# Patient Record
Sex: FEMALE | Race: WHITE | NOT HISPANIC OR LATINO | Employment: FULL TIME | URBAN - METROPOLITAN AREA
[De-identification: names, ages, dates, MRNs, and addresses within clinical notes are randomized per-mention and may not be internally consistent; named-entity substitution may affect disease eponyms.]

---

## 2023-02-08 ENCOUNTER — INITIAL PRENATAL (OUTPATIENT)
Dept: OBGYN CLINIC | Facility: CLINIC | Age: 33
End: 2023-02-08

## 2023-02-08 VITALS
BODY MASS INDEX: 29.98 KG/M2 | DIASTOLIC BLOOD PRESSURE: 70 MMHG | SYSTOLIC BLOOD PRESSURE: 120 MMHG | WEIGHT: 175.6 LBS | HEIGHT: 64 IN

## 2023-02-08 DIAGNOSIS — Z36.89 ENCOUNTER FOR OTHER SPECIFIED ANTENATAL SCREENING: Primary | ICD-10-CM

## 2023-02-08 DIAGNOSIS — Z3A.08 8 WEEKS GESTATION OF PREGNANCY: ICD-10-CM

## 2023-02-08 DIAGNOSIS — Z12.4 SCREENING FOR MALIGNANT NEOPLASM OF THE CERVIX: ICD-10-CM

## 2023-02-08 DIAGNOSIS — F41.9 ANXIETY: ICD-10-CM

## 2023-02-08 DIAGNOSIS — O99.211 OBESITY AFFECTING PREGNANCY IN FIRST TRIMESTER: ICD-10-CM

## 2023-02-08 DIAGNOSIS — O99.341 MENTAL DISORDER AFFECTING PREGNANCY IN FIRST TRIMESTER: ICD-10-CM

## 2023-02-08 DIAGNOSIS — Z36.87 UNSURE OF LMP (LAST MENSTRUAL PERIOD) AS REASON FOR ULTRASOUND SCAN: ICD-10-CM

## 2023-02-08 DIAGNOSIS — O99.210 OBESITY IN PREGNANCY WITH ANTEPARTUM COMPLICATION: Primary | ICD-10-CM

## 2023-02-08 LAB
EXTERNAL CHLAMYDIA SCREEN: NORMAL
EXTERNAL GONORRHEA SCREEN: NORMAL
SL AMB  POCT GLUCOSE, UA: NEGATIVE
SL AMB POCT URINE PROTEIN: NEGATIVE

## 2023-02-08 NOTE — PROGRESS NOTES
OB INTAKE INTERVIEW  Patient is 33 y  o who presents for OB intake at 8 6wks  She is accompanied by: Spouse  The father of her baby Jim Corcoran) is involved in the pregnancy    Last Menstrual Period: 22  Ultrasound: Measured 8 weeks 6 days on 23  Estimated Date of Delivery: 2023 confirmed by ultrasound  Signs/Symptoms of Pregnancy  Current pregnancy symptoms: Nausea, vomiting, fatigue, breast tenderness  Constipation no  Headaches no  Cramping/spotting YES (Cramping on and off, denies spotting)   PICA cravings no    Diabetes-  Body mass index is 30 38 kg/m²  If patient has 1 or more, please order early 1 hour GTT  History of GDM no  BMI >30 YES  History of PCOS or current metformin use no  History of LGA/macrosomic infant (4000g/9lbs) no    If patient has 2 or more, please order early 1 hour GTT  AMA no  First degree relative with type 2 diabetes YES  History of chronic HTN, hyperlipidemia, elevated A1C no  High risk race (, , ,  or ) no    Hypertension- if you answer yes, please order preeclampsia labs (cbc, comprehensive metabolic panel, urine protein creatinine ratio, uric acid)  History of of chronic HTN no  History of gestational HTN no  History of preeclampsia, eclampsia, or HELLP syndrome no  History of diabetes no  History of lupus, autoimmune disease, kidney disease, antiphospholipid syndrome, rheumatoid arthritis, sjogren's syndrome no    Thyroid- if yes order TSH with reflex T4 (Unless TSH value on file within last 4 weeks then do not need to order)  History of thyroid disease no    Bleeding Disorder or Hx of DVT-patient or first degree relative with history of  Order the following if not done previously     (Factor V, antithrombin III, prothrombin gene mutation, protein C and S Ag, lupus anticoagulant, anticardiolipin, beta-2 glycoprotein)   no    OB/GYN-  History of abnormal pap smear no  History of HPV no  History of Herpes/HSV no  History of other STI (gonorrhea, chlamydia, trich) (or current partner with Hep B, Hep C, or HIV) no  History of prior  no  History of prior  no  History of  delivery prior to 36 weeks 6 days no  History of blood transfusion no  Ok for blood transfusion -Yes    Substance screening-   History of tobacco use no  Currently using tobacco no  Currently using alcohol no  Presently using drugs no  Past drug use (if yes, order urine drug screening panel)  no  IV drug use (if yes, order urine drug screening panel) no  Partner drug use (if yes, order urine drug screening panel) no  Parent/Family drug use (do not order urine drug screening panel just for family hx) no    Depression Screening-  PHQ-9 Score: (2)    MRSA Screening-   Does the pt have a hx of MRSA? no  If yes- please follow MRSA protocol and obtain a nasal swab for MRSA culture at 12 week visit  Immunizations:  Influenza vaccine given this season (ask date) -Yes, 10/01/22 through employer   Discussed Tdap vaccine (ask date) -yes   Discussed COVID Vaccine (request pt upload card or bring to next visit) -Yes, 1 and 3 dose  No booster  Genetic/MFM-  Do you or your partner have a history of any of the following in yourselves or first degree relatives? Cystic fibrosis no  Spinal muscular atrophy no  Hemoglobinopathy/Sickle Cell/Thalassemia no  Fragile X Intellectual Disability no    If yes, discuss carrier screening and recommend consultation with MFM/genetic counseling  If no, discuss option for carrier screening and/or genetic testing with Nuchal Ultrasound  Patient interested -Pt declined genetic NIPT  Pt agreed to early anatomy scan  Appointment at [address] -Patient will call to schedule    Interview education  [facility] Pregnancy Essentials Book reviewed and discussed -Yes      Prenatal lab work scripts -Yes    Extra labs ordered:  Early 1 HR GTT      The patient has a history now or in prior pregnancy notable for:  IOL due to gallstones       Details that I feel the provider should be aware of: Depression, Anxiety, panic attacks  The patient was oriented to our practice, reviewed delivering physicians and Aravind Anaya in [facility] for Delivery  All questions were answered      Interviewed by: Kaylie Douglas RN

## 2023-02-11 LAB
C TRACH RRNA SPEC QL NAA+PROBE: NOT DETECTED
CLINICAL INFO: NORMAL
CYTO CVX: NORMAL
CYTOLOGY CMNT CVX/VAG CYTO-IMP: NORMAL
DATE PREVIOUS BX: NORMAL
HPV E6+E7 MRNA CVX QL NAA+PROBE: NOT DETECTED
LMP START DATE: NORMAL
N GONORRHOEA RRNA SPEC QL NAA+PROBE: NOT DETECTED
SL AMB PREV. PAP:: NORMAL
SPECIMEN SOURCE CVX/VAG CYTO: NORMAL

## 2023-02-13 LAB
EXTERNAL ABO GROUPING: NORMAL
EXTERNAL ANTIBODY SCREEN: NORMAL
EXTERNAL HEMATOCRIT: 36.4 %
EXTERNAL HEMOGLOBIN: 12.5 G/DL
EXTERNAL HEPATITIS B SURFACE ANTIGEN: NORMAL
EXTERNAL HIV-1/2 AB-AG: NORMAL
EXTERNAL PLATELET COUNT: 266 K/ÂΜL
EXTERNAL RH FACTOR: NEGATIVE
EXTERNAL RUBELLA IGG QUANTITATION: NORMAL
EXTERNAL SYPHILIS RPR SCREEN: NORMAL

## 2023-02-14 LAB
ABO GROUP BLD: NORMAL
APPEARANCE UR: CLEAR
BACTERIA UR QL AUTO: ABNORMAL /HPF
BASOPHILS # BLD AUTO: 32 CELLS/UL (ref 0–200)
BASOPHILS NFR BLD AUTO: 0.5 %
BILIRUB UR QL STRIP: NEGATIVE
BLD GP AB SCN SERPL QL: NORMAL
COLOR UR: ABNORMAL
EOSINOPHIL # BLD AUTO: 57 CELLS/UL (ref 15–500)
EOSINOPHIL NFR BLD AUTO: 0.9 %
ERYTHROCYTE [DISTWIDTH] IN BLOOD BY AUTOMATED COUNT: 12.6 % (ref 11–15)
GLUCOSE 1H P 50 G GLC PO SERPL-MCNC: 76 MG/DL
GLUCOSE UR QL STRIP: NEGATIVE
HBV SURFACE AG SERPL QL IA: NORMAL
HCT VFR BLD AUTO: 36.4 % (ref 35–45)
HCV AB S/CO SERPL IA: <0.02
HCV AB SERPL QL IA: NORMAL
HGB BLD-MCNC: 12.5 G/DL (ref 11.7–15.5)
HGB UR QL STRIP: NEGATIVE
HIV 1+2 AB+HIV1 P24 AG SERPL QL IA: NORMAL
HYALINE CASTS #/AREA URNS LPF: ABNORMAL /LPF
KETONES UR QL STRIP: NEGATIVE
LEUKOCYTE ESTERASE UR QL STRIP: ABNORMAL
LYMPHOCYTES # BLD AUTO: 1506 CELLS/UL (ref 850–3900)
LYMPHOCYTES NFR BLD AUTO: 23.9 %
MCH RBC QN AUTO: 29.9 PG (ref 27–33)
MCHC RBC AUTO-ENTMCNC: 34.3 G/DL (ref 32–36)
MCV RBC AUTO: 87.1 FL (ref 80–100)
MONOCYTES # BLD AUTO: 447 CELLS/UL (ref 200–950)
MONOCYTES NFR BLD AUTO: 7.1 %
NEUTROPHILS # BLD AUTO: 4259 CELLS/UL (ref 1500–7800)
NEUTROPHILS NFR BLD AUTO: 67.6 %
NITRITE UR QL STRIP: NEGATIVE
PH UR STRIP: 5.5 [PH] (ref 5–8)
PLATELET # BLD AUTO: 266 THOUSAND/UL (ref 140–400)
PMV BLD REES-ECKER: 10.3 FL (ref 7.5–12.5)
PROT UR QL STRIP: ABNORMAL
RBC # BLD AUTO: 4.18 MILLION/UL (ref 3.8–5.1)
RBC #/AREA URNS HPF: ABNORMAL /HPF
RH BLD: NORMAL
RPR SER QL: NORMAL
RUBV IGG SERPL IA-ACNC: 4.52 INDEX
SP GR UR STRIP: 1.03 (ref 1–1.03)
SQUAMOUS #/AREA URNS HPF: ABNORMAL /HPF
WBC # BLD AUTO: 6.3 THOUSAND/UL (ref 3.8–10.8)
WBC #/AREA URNS HPF: ABNORMAL /HPF

## 2023-02-20 PROBLEM — Z3A.08 8 WEEKS GESTATION OF PREGNANCY: Status: ACTIVE | Noted: 2023-02-08

## 2023-02-20 PROBLEM — O99.210 OBESITY IN PREGNANCY WITH ANTEPARTUM COMPLICATION: Status: ACTIVE | Noted: 2023-02-08

## 2023-02-20 NOTE — PROGRESS NOTES
Routine Prenatal Visit  [address] [facility], [address], [facility], [address] [facility]    Assessment/Plan:  Barb Orona is a 33y o  year old  at 10w7d who presents for routine prenatal visit  1  Obesity in pregnancy with antepartum complication  Assessment & Plan:  Early glucose screening ordered      2  Mental disorder affecting pregnancy in first trimester    3  Anxiety    4  BMI 30 0-30 9,adult    5  8 weeks gestation of pregnancy    6  Unsure of LMP (last menstrual period) as reason for ultrasound scan  -     AMB US OB < 14 weeks single or first gestation level 1    7  Screening for malignant neoplasm of the cervix  -     Thinprep Tis Pap and HPV mRNA E6/E7, Chlamydia/N gonorrhoeae        Subjective:     CC: Prenatal care    Gi Aragon is a 33 y o   female who presents for routine prenatal care at 10w7d  Pregnancy ROS: no leakage of fluid, pelvic pain, or vaginal bleeding  no fetal movement  The following portions of the patient's history were reviewed and updated as appropriate: allergies, current medications, past family history, past medical history, obstetric history, gynecologic history, past social history, past surgical history and problem list       Objective:  LMP 2022 (Exact Date)   Pregravid Weight/BMI: 79 4 kg (175 lb) (BMI 30 28)  Current Weight:     Total Weight Gain: 0 272 kg (9 6 oz)        General: Well appearing, no distress  Respiratory: Unlabored breathing  Cardiovascular: Regular rate  Abdomen: Soft, gravid, nontender  Fundal Height: Appropriate for gestational age  Extremities: Warm and well perfused  Non tender

## 2023-03-07 ENCOUNTER — ROUTINE PRENATAL (OUTPATIENT)
Dept: OBGYN CLINIC | Facility: CLINIC | Age: 33
End: 2023-03-07

## 2023-03-07 VITALS — WEIGHT: 177 LBS | SYSTOLIC BLOOD PRESSURE: 102 MMHG | DIASTOLIC BLOOD PRESSURE: 60 MMHG | BODY MASS INDEX: 30.62 KG/M2

## 2023-03-07 DIAGNOSIS — O99.341 MENTAL DISORDER AFFECTING PREGNANCY IN FIRST TRIMESTER: ICD-10-CM

## 2023-03-07 DIAGNOSIS — O99.210 OBESITY IN PREGNANCY WITH ANTEPARTUM COMPLICATION: Primary | ICD-10-CM

## 2023-03-07 DIAGNOSIS — Z36.89 ENCOUNTER FOR OTHER SPECIFIED ANTENATAL SCREENING: ICD-10-CM

## 2023-03-07 DIAGNOSIS — Z3A.12 12 WEEKS GESTATION OF PREGNANCY: ICD-10-CM

## 2023-03-07 LAB
SL AMB  POCT GLUCOSE, UA: NEGATIVE
SL AMB POCT URINE PROTEIN: NEGATIVE

## 2023-03-07 RX ORDER — CETIRIZINE HYDROCHLORIDE 10 MG/1
10 TABLET ORAL AS NEEDED
COMMUNITY

## 2023-03-07 NOTE — ASSESSMENT & PLAN NOTE
Off medication since first pregnancy  Doing well, no complaints  Support offered if symptoms change

## 2023-03-07 NOTE — ASSESSMENT & PLAN NOTE
Doing well, nausea improving  Normal 1st pn and 1hr GTT reviewed  Scheduled for MFM 1st trimester evaluation tomorrow  MSAFP orders given for 15-18 weeks

## 2023-03-07 NOTE — PROGRESS NOTES
Routine Prenatal Visit  [address]   [address], [address], [facility], [facility] [address]    Assessment/Plan:  Mercedes Alpers is a 33y o  year old  at 12w5d who presents for routine prenatal visit  1  Obesity in pregnancy with antepartum complication  Assessment & Plan:  Doing well, nausea improving  Normal 1st pn and 1hr GTT reviewed  Scheduled for MFM 1st trimester evaluation tomorrow  MSAFP orders given for 15-18 weeks  2  12 weeks gestation of pregnancy  -     POCT urine dip    3  Mental disorder affecting pregnancy in first trimester  Assessment & Plan:  Off medication since first pregnancy  Doing well, no complaints  Support offered if symptoms change  4  Encounter for other specified  screening  -     Alpha fetoprotein, maternal; Future  -     Alpha fetoprotein, maternal      Next OB Visit 4 weeks  Subjective:     CC: Prenatal care    Sadiq Burns is a 33 y o   female who presents for routine prenatal care at 12w5d  Pregnancy ROS: no leakage of fluid, pelvic pain, or vaginal bleeding  no fetal movement      The following portions of the patient's history were reviewed and updated as appropriate: allergies, current medications, past family history, past medical history, obstetric history, gynecologic history, past social history, past surgical history and problem list       Objective:  /60   Wt 80 3 kg (177 lb)   LMP 2022 (Exact Date)   BMI 30 62 kg/m²   Pregravid Weight/BMI: 79 4 kg (175 lb) (BMI 30 28)  Current Weight: 80 3 kg (177 lb)   Total Weight Gain: 0 907 kg (2 lb)   Pre-Christopher Vitals    Flowsheet Row Most Recent Value   Prenatal Assessment    Prenatal Vitals    Blood Pressure 102/60   Weight - Scale 80 3 kg (177 lb)   Urine Albumin/Glucose    Dilation/Effacement/Station    Vaginal Drainage    Edema            General: Well appearing, no distress  Abdomen: Soft, gravid, nontender  Fundal Height: Appropriate for gestational age  +FHR

## 2023-03-08 ENCOUNTER — ROUTINE PRENATAL (OUTPATIENT)
Dept: PERINATAL CARE | Facility: OTHER | Age: 33
End: 2023-03-08

## 2023-03-08 VITALS
HEART RATE: 89 BPM | BODY MASS INDEX: 30.32 KG/M2 | DIASTOLIC BLOOD PRESSURE: 52 MMHG | HEIGHT: 64 IN | WEIGHT: 177.6 LBS | SYSTOLIC BLOOD PRESSURE: 98 MMHG

## 2023-03-08 DIAGNOSIS — O36.80X0 ENCOUNTER TO DETERMINE FETAL VIABILITY OF PREGNANCY, SINGLE OR UNSPECIFIED FETUS: Primary | ICD-10-CM

## 2023-03-08 DIAGNOSIS — Z36.82 ENCOUNTER FOR NUCHAL TRANSLUCENCY TESTING: ICD-10-CM

## 2023-03-08 DIAGNOSIS — O99.211 OBESITY AFFECTING PREGNANCY IN FIRST TRIMESTER: ICD-10-CM

## 2023-03-08 DIAGNOSIS — Z3A.12 12 WEEKS GESTATION OF PREGNANCY: ICD-10-CM

## 2023-03-08 DIAGNOSIS — Z36.89 ENCOUNTER FOR OTHER SPECIFIED ANTENATAL SCREENING: ICD-10-CM

## 2023-03-08 RX ORDER — DIPHENHYDRAMINE HYDROCHLORIDE 25 MG/1
25 CAPSULE ORAL DAILY
COMMUNITY
Start: 2023-02-21

## 2023-03-08 NOTE — PROGRESS NOTES
The patient was seen today for an ultrasound  Please see ultrasound report (located under Ob Procedures) for additional details  Thank you very much for allowing us to participate in the care of this very nice patient  Should you have any questions, please do not hesitate to contact me  Mendez Gonzalez MD [id number]  Attending Physician, [place]

## 2023-04-06 ENCOUNTER — ROUTINE PRENATAL (OUTPATIENT)
Dept: OBGYN CLINIC | Facility: CLINIC | Age: 33
End: 2023-04-06

## 2023-04-06 VITALS
HEIGHT: 64 IN | BODY MASS INDEX: 30.73 KG/M2 | DIASTOLIC BLOOD PRESSURE: 62 MMHG | WEIGHT: 180 LBS | SYSTOLIC BLOOD PRESSURE: 90 MMHG

## 2023-04-06 DIAGNOSIS — O26.892 RH NEGATIVE STATE IN ANTEPARTUM PERIOD, SECOND TRIMESTER: ICD-10-CM

## 2023-04-06 DIAGNOSIS — O99.342 MENTAL DISORDER AFFECTING PREGNANCY IN SECOND TRIMESTER: ICD-10-CM

## 2023-04-06 DIAGNOSIS — O99.210 OBESITY IN PREGNANCY WITH ANTEPARTUM COMPLICATION: ICD-10-CM

## 2023-04-06 DIAGNOSIS — J01.00 SUBACUTE MAXILLARY SINUSITIS: ICD-10-CM

## 2023-04-06 DIAGNOSIS — Z3A.17 17 WEEKS GESTATION OF PREGNANCY: Primary | ICD-10-CM

## 2023-04-06 DIAGNOSIS — Z67.91 RH NEGATIVE STATE IN ANTEPARTUM PERIOD, SECOND TRIMESTER: ICD-10-CM

## 2023-04-06 DIAGNOSIS — F41.9 ANXIETY: ICD-10-CM

## 2023-04-06 LAB
SL AMB  POCT GLUCOSE, UA: NEGATIVE
SL AMB POCT URINE PROTEIN: NEGATIVE

## 2023-04-06 RX ORDER — AMOXICILLIN 500 MG/1
500 CAPSULE ORAL EVERY 12 HOURS SCHEDULED
Qty: 20 CAPSULE | Refills: 0 | Status: SHIPPED | OUTPATIENT
Start: 2023-04-06 | End: 2023-04-16

## 2023-04-06 NOTE — PROGRESS NOTES
"Routine Prenatal Visit  [address], [address], [facility], [facility]    Assessment/Plan:  Jerad Laird is a 33y o  year old  at 17w0d who presents for routine prenatal visit  1  Mental disorder affecting pregnancy in first trimester    2  Obesity in pregnancy with antepartum complication    3  12 weeks gestation of pregnancy    4  Anxiety    5  BMI 30 0-30 9,adult      + co of  Sinus pressure  Not relieved w  Allergy meds  + hx of  Sinus infections  No real  Dc from  Nose some cough  Reviewed  Hydration  Declines genetic screening  Reviewed with pt now is the time for  MSAFP  Declines  No bleeding  Labs and   US reviewed   Subjective:     CC: Prenatal care    Matt Bronson is a 33 y o   female who presents for routine prenatal care at 17w0d  Pregnancy ROS: no  leakage of fluid, pelvic pain, or vaginal bleeding   + fetal movement  The following portions of the patient's history were reviewed and updated as appropriate: allergies, current medications, past family history, past medical history, obstetric history, gynecologic history, past social history, past surgical history and problem list       Objective:  BP 90/62   Ht 5' 3 5\" (1 613 m)   Wt 81 6 kg (180 lb)   LMP 2022 (Exact Date)   BMI 31 39 kg/m²   Pregravid Weight/BMI: 79 4 kg (175 lb) (BMI 30 51)  Current Weight: 81 6 kg (180 lb)   Total Weight Gain: 2 268 kg (5 lb)   Pre-Christopher Vitals    Flowsheet Row Most Recent Value   Prenatal Assessment    Movement Absent   Prenatal Vitals    Blood Pressure 90/62   Weight - Scale 81 6 kg (180 lb)   Urine Albumin/Glucose    Dilation/Effacement/Station    Vaginal Drainage    Edema    LLE Edema None   RLE Edema None   Facial Edema Trace           General: Well appearing, no distress  Respiratory: Unlabored breathing  Cardiovascular: Regular rate  Abdomen: Soft, gravid, nontender  Fundal Height: Appropriate for gestational age    Extremities: Warm " and well perfused  Non tender

## 2023-05-01 ENCOUNTER — ROUTINE PRENATAL (OUTPATIENT)
Dept: OBGYN CLINIC | Facility: CLINIC | Age: 33
End: 2023-05-01

## 2023-05-01 VITALS
HEIGHT: 64 IN | BODY MASS INDEX: 31.76 KG/M2 | SYSTOLIC BLOOD PRESSURE: 104 MMHG | WEIGHT: 186 LBS | DIASTOLIC BLOOD PRESSURE: 58 MMHG

## 2023-05-01 DIAGNOSIS — O26.892 RH NEGATIVE STATE IN ANTEPARTUM PERIOD, SECOND TRIMESTER: ICD-10-CM

## 2023-05-01 DIAGNOSIS — Z3A.20 20 WEEKS GESTATION OF PREGNANCY: Primary | ICD-10-CM

## 2023-05-01 DIAGNOSIS — F41.9 ANXIETY: ICD-10-CM

## 2023-05-01 DIAGNOSIS — Z67.91 RH NEGATIVE STATE IN ANTEPARTUM PERIOD, SECOND TRIMESTER: ICD-10-CM

## 2023-05-01 LAB
SL AMB  POCT GLUCOSE, UA: NORMAL
SL AMB POCT URINE PROTEIN: NORMAL

## 2023-05-01 NOTE — PROGRESS NOTES
"Routine Prenatal Visit  [address] [facility], [address], [facility], [place], [address] [facility] [address]    Assessment/Plan:  Jitendra Marquis is a 33y o  year old  at 22w2d who presents for routine prenatal visit  1  20 weeks gestation of pregnancy  -     POCT urine dip    2  Rh negative state in antepartum period, second trimester    3  Anxiety       Labs reviewed   Normal  Msafp  Anatomy scan in 2 d  No bleeding  Subjective:     CC: Prenatal care    Lisa Gonzalez is a 33 y o   female who presents for routine prenatal care at 22w2d  Pregnancy ROS: no  leakage of fluid, pelvic pain, or vaginal bleeding   + fetal movement  The following portions of the patient's history were reviewed and updated as appropriate: allergies, current medications, past family history, past medical history, obstetric history, gynecologic history, past social history, past surgical history and problem list       Objective:  /58 (BP Location: Left arm, Patient Position: Sitting, Cuff Size: Standard)   Ht 5' 3 5\" (1 613 m)   Wt 84 4 kg (186 lb)   LMP 2022 (Exact Date)   BMI 32 43 kg/m²   Pregravid Weight/BMI: 79 4 kg (175 lb) (BMI 30 51)  Current Weight: 84 4 kg (186 lb)   Total Weight Gain: 4 99 kg (11 lb)   Pre- Vitals    Flowsheet Row Most Recent Value   Prenatal Assessment    Fetal Heart Rate 146-158   Fundal Height (cm) 21 cm   Movement Present   Prenatal Vitals    Blood Pressure 104/58   Weight - Scale 84 4 kg (186 lb)   Urine Albumin/Glucose    Dilation/Effacement/Station    Vaginal Drainage    Draining Fluid No   Edema    LLE Edema None   RLE Edema None   Facial Edema None           General: Well appearing, no distress  Respiratory: Unlabored breathing  Cardiovascular: Regular rate  Abdomen: Soft, gravid, nontender  Fundal Height: Appropriate for gestational age  Extremities: Warm and well perfused  Non tender    "

## 2023-05-03 ENCOUNTER — ROUTINE PRENATAL (OUTPATIENT)
Dept: PERINATAL CARE | Facility: OTHER | Age: 33
End: 2023-05-03

## 2023-05-03 VITALS
HEIGHT: 63 IN | BODY MASS INDEX: 33.17 KG/M2 | HEART RATE: 90 BPM | DIASTOLIC BLOOD PRESSURE: 54 MMHG | WEIGHT: 187.2 LBS | SYSTOLIC BLOOD PRESSURE: 100 MMHG

## 2023-05-03 DIAGNOSIS — Z36.86 ENCOUNTER FOR ANTENATAL SCREENING FOR CERVICAL LENGTH: ICD-10-CM

## 2023-05-03 DIAGNOSIS — O99.210 OBESITY IN PREGNANCY WITH ANTEPARTUM COMPLICATION: Primary | ICD-10-CM

## 2023-05-03 DIAGNOSIS — Z3A.20 20 WEEKS GESTATION OF PREGNANCY: ICD-10-CM

## 2023-05-03 NOTE — PROGRESS NOTES
Ultrasound Probe Disinfection    A transvaginal ultrasound was performed  Prior to use, disinfection was performed with High Level Disinfection Process (Trophon)  Probe serial number [id number]: [id number] was used        Radha Campos  05/03/23  1:31 PM

## 2023-05-03 NOTE — PROGRESS NOTES
The patient was seen today for an ultrasound  Please see ultrasound report (located under Ob Procedures) for additional details  Thank you very much for allowing us to participate in the care of this very nice patient  Should you have any questions, please do not hesitate to contact me  MD Sukhjinder Chowdhuryucah  Attending Physician, [place]

## 2023-05-31 ENCOUNTER — ROUTINE PRENATAL (OUTPATIENT)
Dept: OBGYN CLINIC | Facility: CLINIC | Age: 33
End: 2023-05-31

## 2023-05-31 VITALS
HEIGHT: 63 IN | SYSTOLIC BLOOD PRESSURE: 112 MMHG | BODY MASS INDEX: 34.38 KG/M2 | DIASTOLIC BLOOD PRESSURE: 58 MMHG | WEIGHT: 194 LBS

## 2023-05-31 DIAGNOSIS — Z36.89 ENCOUNTER FOR OTHER SPECIFIED ANTENATAL SCREENING: ICD-10-CM

## 2023-05-31 DIAGNOSIS — Z3A.24 24 WEEKS GESTATION OF PREGNANCY: ICD-10-CM

## 2023-05-31 DIAGNOSIS — O26.892 RH NEGATIVE STATE IN ANTEPARTUM PERIOD, SECOND TRIMESTER: ICD-10-CM

## 2023-05-31 DIAGNOSIS — O99.341 MENTAL DISORDER AFFECTING PREGNANCY IN FIRST TRIMESTER: ICD-10-CM

## 2023-05-31 DIAGNOSIS — Z67.91 RH NEGATIVE STATE IN ANTEPARTUM PERIOD, SECOND TRIMESTER: ICD-10-CM

## 2023-05-31 DIAGNOSIS — O99.210 OBESITY IN PREGNANCY WITH ANTEPARTUM COMPLICATION: Primary | ICD-10-CM

## 2023-05-31 DIAGNOSIS — F41.9 ANXIETY: ICD-10-CM

## 2023-05-31 LAB
SL AMB  POCT GLUCOSE, UA: NORMAL
SL AMB POCT URINE PROTEIN: NORMAL

## 2023-05-31 NOTE — PROGRESS NOTES
"Routine Prenatal Visit  [address] [facility], [address], [facility], [address] [facility] [address]    Assessment/Plan:  Kalpesh Harvey is a 33y o  year old  at 18w6d who presents for routine prenatal visit  1  Obesity in pregnancy with antepartum complication    2  Mental disorder affecting pregnancy in first trimester    3  Rh negative state in antepartum period, second trimester  Assessment & Plan:  - T&S ordered with 28 week labs  - Plan for administration of Rhogam at 28 weeks  Reviewed that labs need to be completed prior  4  24 weeks gestation of pregnancy  Assessment & Plan:  - PTL/PPROM/Bleeding precautions given  - MFM consult report from level II ultrasound reviewed  Repeat US is scheduled in 3rd trimester, per MFM recommendations  - 28 week labs ordered, lab requisition provided to patient  Patient is Rh negative  Reviewed recommendation for administration of Rhogam at next visit  - Problem list updated, results console reviewed and updated with pertinent prenatal labs  - PMH, PSH, medications reviewed and updated as needed  - Return to office in 4 wk(s) for routine prenatal care      Orders:  -     POCT urine dip    5  Encounter for other specified  screening  -     Glucose, 1H PG; Future  -     ABO/Rh; Future  -     Antibody screen; Future  -     CBC; Future  -     RPR (MONITOR) W/REFL TITER (REFL); Future  -     Glucose, 1H PG  -     ABO/Rh  -     Antibody screen  -     CBC  -     RPR (MONITOR) W/REFL TITER (REFL)    6  Anxiety    7  BMI 30 0-30 9,adult          Subjective:   Shaw Early is a 33 y o   who presents for routine prenatal care at 24w6d    Complaints today: No  LOF: No; VB: No; Contractions: No; FM: Present    Objective:  /58 (BP Location: Right arm, Patient Position: Sitting, Cuff Size: Standard)   Ht 5' 3\" (1 6 m)   Wt 88 kg (194 lb)   LMP 2022 (Exact Date)   BMI 34 37 kg/m²     General: Well appearing, no distress  Respiratory: " Unlabored breathing  Cardiovascular: Regular rate  Abdomen: Soft, gravid, nontender  Extremities: Warm and well perfused  Non tender      Pregravid Weight/BMI: 79 4 kg (175 lb) (BMI 31 01)  Current Weight: 88 kg (194 lb)   Total Weight Gain: 8 618 kg (19 lb)     Pre-Christopher Vitals    Flowsheet Row Most Recent Value   Prenatal Assessment    Fetal Heart Rate 145   Fundal Height (cm) 24 cm   Movement Present   Prenatal Vitals    Blood Pressure 112/58   Weight - Scale 88 kg (194 lb)   Urine Albumin/Glucose    Dilation/Effacement/Station    Vaginal Drainage    Draining Fluid No   Edema    LLE Edema None   RLE Edema None   Facial Edema None           Cierra Lambert MD  2023 5:32 PM

## 2023-05-31 NOTE — ASSESSMENT & PLAN NOTE
- T&S ordered with 28 week labs  - Plan for administration of Rhogam at 28 weeks  Reviewed that labs need to be completed prior

## 2023-05-31 NOTE — ASSESSMENT & PLAN NOTE
- PTL/PPROM/Bleeding precautions given  - MFM consult report from level II ultrasound reviewed  Repeat US is scheduled in 3rd trimester, per [facility] recommendations  - 28 week labs ordered, lab requisition provided to patient  Patient is Rh negative  Reviewed recommendation for administration of Rhogam at next visit  - Problem list updated, results console reviewed and updated with pertinent prenatal labs  - PMH, PSH, medications reviewed and updated as needed    - Return to office in 4 wk(s) for routine prenatal care

## 2023-06-26 LAB
EXTERNAL ANTIBODY SCREEN: NORMAL
EXTERNAL HEMOGLOBIN: 11.1 G/DL
EXTERNAL PLATELET COUNT: 227 K/ÂΜL
EXTERNAL RH FACTOR: NEGATIVE
EXTERNAL SYPHILIS TOTAL IGG/IGM SCREENING: NORMAL

## 2023-06-27 PROBLEM — Z3A.28 28 WEEKS GESTATION OF PREGNANCY: Status: ACTIVE | Noted: 2023-02-08

## 2023-06-27 PROBLEM — O26.893 RH NEGATIVE STATE IN ANTEPARTUM PERIOD, THIRD TRIMESTER: Status: ACTIVE | Noted: 2023-04-06

## 2023-06-27 PROBLEM — O99.343 MENTAL DISORDER AFFECTING PREGNANCY IN THIRD TRIMESTER: Status: ACTIVE | Noted: 2023-02-08

## 2023-06-27 PROBLEM — O99.013 ANEMIA AFFECTING PREGNANCY IN THIRD TRIMESTER: Status: ACTIVE | Noted: 2023-06-27

## 2023-06-27 LAB
ABO GROUP BLD: NORMAL
BLD GP AB SCN SERPL QL: NORMAL
ERYTHROCYTE [DISTWIDTH] IN BLOOD BY AUTOMATED COUNT: 11.7 % (ref 11–15)
GLUCOSE 1H P 50 G GLC PO SERPL-MCNC: 107 MG/DL
HCT VFR BLD AUTO: 32.4 % (ref 35–45)
HGB BLD-MCNC: 11.1 G/DL (ref 11.7–15.5)
MCH RBC QN AUTO: 29.8 PG (ref 27–33)
MCHC RBC AUTO-ENTMCNC: 34.3 G/DL (ref 32–36)
MCV RBC AUTO: 86.9 FL (ref 80–100)
PLATELET # BLD AUTO: 227 THOUSAND/UL (ref 140–400)
PMV BLD REES-ECKER: 10.9 FL (ref 7.5–12.5)
RBC # BLD AUTO: 3.73 MILLION/UL (ref 3.8–5.1)
RH BLD: NORMAL
RPR SER QL: NORMAL
WBC # BLD AUTO: 9.5 THOUSAND/UL (ref 3.8–10.8)

## 2023-06-28 ENCOUNTER — ROUTINE PRENATAL (OUTPATIENT)
Dept: OBGYN CLINIC | Facility: CLINIC | Age: 33
End: 2023-06-28
Payer: COMMERCIAL

## 2023-06-28 VITALS
DIASTOLIC BLOOD PRESSURE: 58 MMHG | BODY MASS INDEX: 35.9 KG/M2 | HEIGHT: 63 IN | SYSTOLIC BLOOD PRESSURE: 90 MMHG | WEIGHT: 202.6 LBS

## 2023-06-28 DIAGNOSIS — O99.213 OBESITY AFFECTING PREGNANCY IN THIRD TRIMESTER: ICD-10-CM

## 2023-06-28 DIAGNOSIS — Z67.91 RH NEGATIVE STATE IN ANTEPARTUM PERIOD, THIRD TRIMESTER: ICD-10-CM

## 2023-06-28 DIAGNOSIS — O99.343 MENTAL DISORDER AFFECTING PREGNANCY IN THIRD TRIMESTER: ICD-10-CM

## 2023-06-28 DIAGNOSIS — O26.893 RH NEGATIVE STATE IN ANTEPARTUM PERIOD, THIRD TRIMESTER: ICD-10-CM

## 2023-06-28 DIAGNOSIS — Z3A.28 28 WEEKS GESTATION OF PREGNANCY: Primary | ICD-10-CM

## 2023-06-28 DIAGNOSIS — Z23 NEED FOR TDAP VACCINATION: ICD-10-CM

## 2023-06-28 LAB
SL AMB  POCT GLUCOSE, UA: NORMAL
SL AMB POCT URINE PROTEIN: NORMAL

## 2023-06-28 PROCEDURE — 90715 TDAP VACCINE 7 YRS/> IM: CPT | Performed by: OBSTETRICS & GYNECOLOGY

## 2023-06-28 PROCEDURE — 90471 IMMUNIZATION ADMIN: CPT | Performed by: OBSTETRICS & GYNECOLOGY

## 2023-06-28 PROCEDURE — PNV: Performed by: OBSTETRICS & GYNECOLOGY

## 2023-06-28 PROCEDURE — 96372 THER/PROPH/DIAG INJ SC/IM: CPT | Performed by: OBSTETRICS & GYNECOLOGY

## 2023-06-28 PROCEDURE — 81002 URINALYSIS NONAUTO W/O SCOPE: CPT | Performed by: OBSTETRICS & GYNECOLOGY

## 2023-06-28 RX ORDER — TIZANIDINE 4 MG/1
TABLET ORAL AS NEEDED
COMMUNITY
End: 2023-06-28

## 2023-06-28 NOTE — PROGRESS NOTES
"Routine Prenatal Visit  [address] [facility], [address], [facility], [address] [facility] [address]    Assessment/Plan:  Fernie De La Cruz is a 33y o  year old  at 30w11d who presents for routine prenatal visit  1  Mental disorder affecting pregnancy in third trimester  Assessment & Plan:  Doing well w/o medications  2  Obesity affecting pregnancy in third trimester  Assessment & Plan:  Growth u/s at 32 weeks      3  Rh negative state in antepartum period, third trimester  Assessment & Plan:  Received Rhogam today  4  Need for Tdap vaccination  -     TDAP VACCINE GREATER THAN OR EQUAL TO 8YO IM    5  28 weeks gestation of pregnancy        Next OB Visit 2 weeks  Subjective:     CC: Prenatal care    Collins Delgadillo is a 33 y o   female who presents for routine prenatal care at 28w6d  Pregnancy ROS: no leakage of fluid, pelvic pain, or vaginal bleeding  normal fetal movement  The following portions of the patient's history were reviewed and updated as appropriate: allergies, current medications, past family history, past medical history, obstetric history, gynecologic history, past social history, past surgical history and problem list       Objective:  BP 90/58   Ht 5' 3\" (1 6 m)   Wt 91 9 kg (202 lb 9 6 oz)   LMP 2022 (Exact Date)   BMI 35 89 kg/m²   Pregravid Weight/BMI: 79 4 kg (175 lb) (BMI 31 01)  Current Weight: 91 9 kg (202 lb 9 6 oz)   Total Weight Gain: 12 5 kg (27 lb 9 6 oz)   Pre- Vitals    Flowsheet Row Most Recent Value   Prenatal Assessment    Fetal Heart Rate 150   Fundal Height (cm) 30 cm   Movement Present   Prenatal Vitals    Blood Pressure 90/58   Weight - Scale 91 9 kg (202 lb 9 6 oz)   Urine Albumin/Glucose    Dilation/Effacement/Station    Vaginal Drainage    Draining Fluid No   Edema    LLE Edema None   RLE Edema None   Facial Edema None           General: Well appearing, no distress  Abdomen: Soft, gravid, nontender  Extremities: Non tender    "

## 2023-06-29 ENCOUNTER — TELEPHONE (OUTPATIENT)
Dept: OBGYN CLINIC | Facility: CLINIC | Age: 33
End: 2023-06-29

## 2023-06-29 LAB
DME PARACHUTE DELIVERY DATE REQUESTED: NORMAL
DME PARACHUTE ITEM DESCRIPTION: NORMAL
DME PARACHUTE ORDER STATUS: NORMAL
DME PARACHUTE SUPPLIER NAME: NORMAL
DME PARACHUTE SUPPLIER PHONE: NORMAL

## 2023-06-29 NOTE — TELEPHONE ENCOUNTER
Breast pump order request form received  Breast pump orders placed through [facility]    Order selection Spectra S2 Plus

## 2023-07-08 LAB
DME PARACHUTE DELIVERY DATE ACTUAL: NORMAL
DME PARACHUTE DELIVERY DATE REQUESTED: NORMAL
DME PARACHUTE ITEM DESCRIPTION: NORMAL
DME PARACHUTE ORDER STATUS: NORMAL
DME PARACHUTE SUPPLIER NAME: NORMAL
DME PARACHUTE SUPPLIER PHONE: NORMAL

## 2023-07-13 ENCOUNTER — ROUTINE PRENATAL (OUTPATIENT)
Dept: OBGYN CLINIC | Facility: CLINIC | Age: 33
End: 2023-07-13
Payer: COMMERCIAL

## 2023-07-13 VITALS
DIASTOLIC BLOOD PRESSURE: 62 MMHG | HEIGHT: 63 IN | SYSTOLIC BLOOD PRESSURE: 112 MMHG | WEIGHT: 203 LBS | BODY MASS INDEX: 35.97 KG/M2

## 2023-07-13 DIAGNOSIS — O99.013 ANEMIA AFFECTING PREGNANCY IN THIRD TRIMESTER: ICD-10-CM

## 2023-07-13 DIAGNOSIS — O26.893 RH NEGATIVE STATE IN ANTEPARTUM PERIOD, THIRD TRIMESTER: ICD-10-CM

## 2023-07-13 DIAGNOSIS — Z3A.31 31 WEEKS GESTATION OF PREGNANCY: Primary | ICD-10-CM

## 2023-07-13 DIAGNOSIS — Z67.91 RH NEGATIVE STATE IN ANTEPARTUM PERIOD, THIRD TRIMESTER: ICD-10-CM

## 2023-07-13 DIAGNOSIS — F41.9 ANXIETY: ICD-10-CM

## 2023-07-13 DIAGNOSIS — O99.343 MENTAL DISORDER AFFECTING PREGNANCY IN THIRD TRIMESTER: ICD-10-CM

## 2023-07-13 DIAGNOSIS — O99.213 OBESITY AFFECTING PREGNANCY IN THIRD TRIMESTER: ICD-10-CM

## 2023-07-13 LAB
SL AMB  POCT GLUCOSE, UA: NORMAL
SL AMB POCT URINE PROTEIN: NORMAL

## 2023-07-13 PROCEDURE — 81002 URINALYSIS NONAUTO W/O SCOPE: CPT | Performed by: OBSTETRICS & GYNECOLOGY

## 2023-07-13 PROCEDURE — PNV: Performed by: OBSTETRICS & GYNECOLOGY

## 2023-07-13 RX ORDER — PNV NO.95/FERROUS FUM/FOLIC AC 28MG-0.8MG
TABLET ORAL
COMMUNITY
Start: 2023-07-01

## 2023-07-13 NOTE — PROGRESS NOTES
Routine Prenatal Visit  [address] [place] [address], [address], [facility], [address] [facility],[address]    Assessment/Plan:  Gelacio Mobley is a 33y.o. year old  at 31w0d who presents for routine prenatal visit. 1. 31 weeks gestation of pregnancy  -     POCT urine dip    2. Rh negative state in antepartum period, third trimester    3. Mental disorder affecting pregnancy in third trimester    4. Obesity affecting pregnancy in third trimester    5. BMI 30.0-30.9,adult    6. Anxiety    7. Anemia affecting pregnancy in third trimester        Next OB Visit 2 weeks. Subjective:     CC: Prenatal care    Jeny Sims is a 33 y.o.  female who presents for routine prenatal care at 31w0d. Pregnancy ROS: no leakage of fluid, pelvic pain, or vaginal bleeding. normal fetal movement. The following portions of the patient's history were reviewed and updated as appropriate: allergies, current medications, past family history, past medical history, obstetric history, gynecologic history, past social history, past surgical history and problem list.      Objective:  /62 (BP Location: Right arm, Patient Position: Sitting, Cuff Size: Standard)   Ht 5' 3" (1.6 m)   Wt 92.1 kg (203 lb)   LMP 2022 (Exact Date)   BMI 35.96 kg/m²   Pregravid Weight/BMI: 79.4 kg (175 lb) (BMI 31.01)  Current Weight: 92.1 kg (203 lb)   Total Weight Gain: 12.7 kg (28 lb)   Pre-Christopher Vitals    Flowsheet Row Most Recent Value   Prenatal Assessment    Fetal Heart Rate 150   Fundal Height (cm) 32 cm   Movement Present   Prenatal Vitals    Blood Pressure 112/62   Weight - Scale 92.1 kg (203 lb)   Urine Albumin/Glucose    Dilation/Effacement/Station    Vaginal Drainage    Draining Fluid No   Edema            General: Well appearing, no distress  Abdomen: Soft, gravid, nontender  Extremities: Non tender.

## 2023-07-24 ENCOUNTER — ROUTINE PRENATAL (OUTPATIENT)
Dept: OBGYN CLINIC | Facility: CLINIC | Age: 33
End: 2023-07-24
Payer: COMMERCIAL

## 2023-07-24 VITALS
DIASTOLIC BLOOD PRESSURE: 70 MMHG | SYSTOLIC BLOOD PRESSURE: 110 MMHG | WEIGHT: 208 LBS | HEIGHT: 63 IN | BODY MASS INDEX: 36.86 KG/M2

## 2023-07-24 DIAGNOSIS — Z67.91 RH NEGATIVE STATE IN ANTEPARTUM PERIOD, THIRD TRIMESTER: ICD-10-CM

## 2023-07-24 DIAGNOSIS — O99.013 ANEMIA AFFECTING PREGNANCY IN THIRD TRIMESTER: ICD-10-CM

## 2023-07-24 DIAGNOSIS — O99.213 OBESITY AFFECTING PREGNANCY IN THIRD TRIMESTER: ICD-10-CM

## 2023-07-24 DIAGNOSIS — O99.343 MENTAL DISORDER AFFECTING PREGNANCY IN THIRD TRIMESTER: ICD-10-CM

## 2023-07-24 DIAGNOSIS — Z3A.32 32 WEEKS GESTATION OF PREGNANCY: Primary | ICD-10-CM

## 2023-07-24 DIAGNOSIS — O26.893 RH NEGATIVE STATE IN ANTEPARTUM PERIOD, THIRD TRIMESTER: ICD-10-CM

## 2023-07-24 DIAGNOSIS — F41.9 ANXIETY: ICD-10-CM

## 2023-07-24 LAB
SL AMB  POCT GLUCOSE, UA: NORMAL
SL AMB POCT URINE PROTEIN: NORMAL

## 2023-07-24 PROCEDURE — 81002 URINALYSIS NONAUTO W/O SCOPE: CPT | Performed by: OBSTETRICS & GYNECOLOGY

## 2023-07-24 PROCEDURE — PNV: Performed by: OBSTETRICS & GYNECOLOGY

## 2023-07-24 NOTE — PROGRESS NOTES
Routine Prenatal Visit  [address] [place] [address], [address], [facility], [address] [facility],[address]    Assessment/Plan:  Akosua Levy is a 33y.o. year old  at 32w4d who presents for routine prenatal visit. 1. 32 weeks gestation of pregnancy  -     POCT urine dip    2. Rh negative state in antepartum period, third trimester    3. Mental disorder affecting pregnancy in third trimester    4. Obesity affecting pregnancy in third trimester    5. BMI 30.0-30.9,adult    6. Anxiety    7. Anemia affecting pregnancy in third trimester          Subjective:     CC: Prenatal care    Geovani Pennington is a 33 y.o.  female who presents for routine prenatal care at 32w4d. Pregnancy ROS: no  leakage of fluid, pelvic pain, or vaginal bleeding.  +  fetal movement. The following portions of the patient's history were reviewed and updated as appropriate: allergies, current medications, past family history, past medical history, obstetric history, gynecologic history, past social history, past surgical history and problem list.      Objective:  /70 (BP Location: Left arm, Patient Position: Sitting, Cuff Size: Standard)   Ht 5' 3" (1.6 m)   Wt 94.3 kg (208 lb)   LMP 2022 (Exact Date)   BMI 36.85 kg/m²   Pregravid Weight/BMI: 79.4 kg (175 lb) (BMI 31.01)  Current Weight: 94.3 kg (208 lb)   Total Weight Gain: 15 kg (33 lb)   Pre-Christopher Vitals    Flowsheet Row Most Recent Value   Prenatal Assessment    Prenatal Vitals    Blood Pressure 110/70   Weight - Scale 94.3 kg (208 lb)   Urine Albumin/Glucose    Dilation/Effacement/Station    Vaginal Drainage    Edema            General: Well appearing, no distress  Respiratory: Unlabored breathing  Cardiovascular: Regular rate. Abdomen: Soft, gravid, nontender  Fundal Height: Appropriate for gestational age. Extremities: Warm and well perfused. Non tender.

## 2023-07-26 ENCOUNTER — ULTRASOUND (OUTPATIENT)
Dept: PERINATAL CARE | Facility: OTHER | Age: 33
End: 2023-07-26
Payer: COMMERCIAL

## 2023-07-26 VITALS
DIASTOLIC BLOOD PRESSURE: 64 MMHG | HEART RATE: 97 BPM | HEIGHT: 63 IN | SYSTOLIC BLOOD PRESSURE: 112 MMHG | BODY MASS INDEX: 37 KG/M2 | WEIGHT: 208.8 LBS

## 2023-07-26 DIAGNOSIS — Z3A.32 32 WEEKS GESTATION OF PREGNANCY: ICD-10-CM

## 2023-07-26 DIAGNOSIS — Z36.89 ENCOUNTER FOR ULTRASOUND TO ASSESS FETAL GROWTH: ICD-10-CM

## 2023-07-26 DIAGNOSIS — O99.213 OBESITY AFFECTING PREGNANCY IN THIRD TRIMESTER: Primary | ICD-10-CM

## 2023-07-26 PROCEDURE — 76816 OB US FOLLOW-UP PER FETUS: CPT | Performed by: STUDENT IN AN ORGANIZED HEALTH CARE EDUCATION/TRAINING PROGRAM

## 2023-07-26 NOTE — PATIENT INSTRUCTIONS
Thank you for choosing us for your  care today. If you have any questions about your ultrasound or care, please do not hesitate to contact us or your primary obstetrician. Some general instructions for your pregnancy are:    Exercise: Aim for 22 minutes per day (150 minutes per week) of regular exercise. Walking is great! Nutrition: Choose healthy sources of calcium, iron, and protein. Learn about Preeclampsia: preeclampsia is a common, serious high blood pressure complication in pregnancy. A blood pressure of [id number] (systolic or top number) or 93QUFA (diastolic or bottom number) is not normal and needs evaluation by your doctor. Aspirin is sometimes prescribed in early pregnancy to prevent preeclampsia in women with risk factors - ask your obstetrician if you should be on this medication. For more resources, visit:  MapCoverage.fi  If you smoke, try to reduce how many cigarettes you smoke or try to quit completely. Do not vape. Other warning signs to watch out for in pregnancy or postpartum: chest pain, obstructed breathing or shortness of breath, seizures, thoughts of hurting yourself or your baby, bleeding, a painful or swollen leg, fever, or headache (see [employer] POST-BIRTH Warning Signs campaign). If these happen call 911. Itching is also not normal in pregnancy and if you experience this, especially over your hands and feet, potentially worse at night, notify your doctors.

## 2023-07-26 NOTE — PROGRESS NOTES
[address] [facility]: Ms. Juan Burgos was seen today for fetal growth assessment ultrasound. See ultrasound report under "OB Procedures" tab. Please don't hesitate to contact our office with any concerns or questions.   -Joanna Hastings MD

## 2023-08-11 ENCOUNTER — ROUTINE PRENATAL (OUTPATIENT)
Dept: OBGYN CLINIC | Facility: CLINIC | Age: 33
End: 2023-08-11
Payer: COMMERCIAL

## 2023-08-11 VITALS
BODY MASS INDEX: 37.7 KG/M2 | SYSTOLIC BLOOD PRESSURE: 96 MMHG | HEIGHT: 63 IN | DIASTOLIC BLOOD PRESSURE: 64 MMHG | WEIGHT: 212.8 LBS

## 2023-08-11 DIAGNOSIS — O99.343 MENTAL DISORDER AFFECTING PREGNANCY IN THIRD TRIMESTER: ICD-10-CM

## 2023-08-11 DIAGNOSIS — Z3A.35 35 WEEKS GESTATION OF PREGNANCY: ICD-10-CM

## 2023-08-11 DIAGNOSIS — O99.213 OBESITY AFFECTING PREGNANCY IN THIRD TRIMESTER: ICD-10-CM

## 2023-08-11 DIAGNOSIS — F41.9 ANXIETY: ICD-10-CM

## 2023-08-11 DIAGNOSIS — Z67.91 RH NEGATIVE STATE IN ANTEPARTUM PERIOD, THIRD TRIMESTER: Primary | ICD-10-CM

## 2023-08-11 DIAGNOSIS — O99.013 ANEMIA AFFECTING PREGNANCY IN THIRD TRIMESTER: ICD-10-CM

## 2023-08-11 DIAGNOSIS — O26.893 RH NEGATIVE STATE IN ANTEPARTUM PERIOD, THIRD TRIMESTER: Primary | ICD-10-CM

## 2023-08-11 LAB
SL AMB  POCT GLUCOSE, UA: NORMAL
SL AMB POCT URINE PROTEIN: NORMAL

## 2023-08-11 PROCEDURE — PNV: Performed by: PHYSICIAN ASSISTANT

## 2023-08-11 PROCEDURE — 81002 URINALYSIS NONAUTO W/O SCOPE: CPT | Performed by: PHYSICIAN ASSISTANT

## 2023-08-11 NOTE — PROGRESS NOTES
Routine Prenatal Visit  [address] [place] [address], [address], [facility], [address] [facility],8W    Assessment/Plan:  Megan Fritz is a 33y.o. year old  at 35w1d who presents for routine prenatal visit. 1. Rh negative state in antepartum period, third trimester    2. 35 weeks gestation of pregnancy  Assessment & Plan:  Continue routine prenatal care. Return to office for ob check/GBS/delivery consent in 1 week. Orders:  -     POCT urine dip    3. Mental disorder affecting pregnancy in third trimester    4. Obesity affecting pregnancy in third trimester    5. BMI 30.0-30.9,adult    6. Anxiety    7. Anemia affecting pregnancy in third trimester      Next OB Visit 1 week. Subjective:     CC: Prenatal care    Genny Noriega is a 33 y.o.  female who presents for routine prenatal care at 35w1d. Pregnancy ROS: Swelling in lower legs and feet, more so on the left side. No pain in calf. Good FM, No N/V, HA, cramping, VB, LOF, domestic violence, or smoking. Tolerating PNV.       The following portions of the patient's history were reviewed and updated as appropriate: allergies, current medications, past family history, past medical history, obstetric history, gynecologic history, past social history, past surgical history and problem list.      Objective:  BP 96/64 (BP Location: Left arm, Patient Position: Sitting, Cuff Size: Standard)   Ht 5' 3" (1.6 m)   Wt 96.5 kg (212 lb 12.8 oz)   LMP 2022 (Exact Date)   BMI 37.70 kg/m²   Pregravid Weight/BMI: 79.4 kg (175 lb) (BMI 31.01)  Current Weight: 96.5 kg (212 lb 12.8 oz)   Total Weight Gain: 17.1 kg (37 lb 12.8 oz)   Pre-Christopher Vitals    Flowsheet Row Most Recent Value   Prenatal Assessment    Fetal Heart Rate 145   Fundal Height (cm) 36 cm   Movement Present   Prenatal Vitals    Blood Pressure 96/64   Weight - Scale 96.5 kg (212 lb 12.8 oz)   Urine Albumin/Glucose    Dilation/Effacement/Station    Vaginal Drainage    Draining Fluid No Edema    LLE Edema Trace   RLE Edema Trace   Facial Edema None           General: Well appearing, no distress  Abdomen: Soft, gravid, nontender  Fundal Height: Appropriate for gestational age. Extremities: Warm and well perfused. Non tender.

## 2023-08-14 ENCOUNTER — TELEPHONE (OUTPATIENT)
Dept: OBGYN CLINIC | Facility: CLINIC | Age: 33
End: 2023-08-14

## 2023-08-14 NOTE — TELEPHONE ENCOUNTER
[place] Leandra l/m her whole family has covid. What does she need to do. Return call to Gelacio Mobley, she reports they did not test however exposed to outside family member who is positive. Son with stuffy nose, cold type symptoms. Gelacio Myriamronny reports fatigue, slight congestion. Denies fever/cough. Reviewed rest, increase fluids, proper nutritional intake. Wear mask if MUST go out to help prevent spread and reduce her risk of devika additional illness. Can use tylenol, cough drops or plain cough medicine, robitussin if needed. If develops shortness of breath or increased symptoms encourage f/u with PCP for eval.  Patient in agreement.

## 2023-08-25 ENCOUNTER — ROUTINE PRENATAL (OUTPATIENT)
Dept: OBGYN CLINIC | Facility: CLINIC | Age: 33
End: 2023-08-25

## 2023-08-25 VITALS
WEIGHT: 215 LBS | HEIGHT: 63 IN | BODY MASS INDEX: 38.09 KG/M2 | DIASTOLIC BLOOD PRESSURE: 72 MMHG | SYSTOLIC BLOOD PRESSURE: 112 MMHG

## 2023-08-25 DIAGNOSIS — Z3A.37 37 WEEKS GESTATION OF PREGNANCY: Primary | ICD-10-CM

## 2023-08-25 DIAGNOSIS — Z36.85 ENCOUNTER FOR ANTENATAL SCREENING FOR STREPTOCOCCUS B: ICD-10-CM

## 2023-08-25 DIAGNOSIS — O26.893 RH NEGATIVE STATE IN ANTEPARTUM PERIOD, THIRD TRIMESTER: ICD-10-CM

## 2023-08-25 DIAGNOSIS — Z30.09 CONSULTATION FOR FEMALE STERILIZATION: ICD-10-CM

## 2023-08-25 DIAGNOSIS — Z67.91 RH NEGATIVE STATE IN ANTEPARTUM PERIOD, THIRD TRIMESTER: ICD-10-CM

## 2023-08-25 DIAGNOSIS — O99.013 ANEMIA AFFECTING PREGNANCY IN THIRD TRIMESTER: ICD-10-CM

## 2023-08-25 LAB
SL AMB  POCT GLUCOSE, UA: NORMAL
SL AMB POCT URINE PROTEIN: NORMAL

## 2023-08-25 NOTE — PROGRESS NOTES
Routine Prenatal Visit  [address] [place] [address], [address], [facility], [address] [facility],8W    Assessment/Plan:  Cassi Guido is a 33y.o. year old  at 37w1d who presents for routine prenatal visit. 1. 37 weeks gestation of pregnancy  -     POCT urine dip    2. Encounter for  screening for Streptococcus B  -     Strep Gp B Culture    3. Anemia affecting pregnancy in third trimester    4. Rh negative state in antepartum period, third trimester    5. Consultation for female sterilization  Comments:  If C/S desires   sterilization. + fm  No utcx  Plans to bottle feed. If has  C/s desires  Sterilization. Subjective:     CC: Prenatal care    Vito Jon is a 33 y.o.  female who presents for routine prenatal care at 37w1d. Pregnancy ROS: no  leakage of fluid, pelvic pain, or vaginal bleeding.  + fetal movement. The following portions of the patient's history were reviewed and updated as appropriate: allergies, current medications, past family history, past medical history, obstetric history, gynecologic history, past social history, past surgical history and problem list.      Objective:  /72 (BP Location: Left arm, Patient Position: Sitting, Cuff Size: Standard)   Ht 5' 3" (1.6 m)   Wt 97.5 kg (215 lb)   LMP 2022 (Exact Date)   BMI 38.09 kg/m²   Pregravid Weight/BMI: 79.4 kg (175 lb) (BMI 31.01)  Current Weight: 97.5 kg (215 lb)   Total Weight Gain: 18.1 kg (40 lb)   Pre- Vitals    Flowsheet Row Most Recent Value   Prenatal Assessment    Prenatal Vitals    Blood Pressure 112/72   Weight - Scale 97.5 kg (215 lb)   Urine Albumin/Glucose    Dilation/Effacement/Station    Vaginal Drainage    Edema            General: Well appearing, no distress  Respiratory: Unlabored breathing  Cardiovascular: Regular rate. Abdomen: Soft, gravid, nontender  Fundal Height: Appropriate for gestational age. Extremities: Warm and well perfused. Non tender.

## 2023-09-01 ENCOUNTER — ROUTINE PRENATAL (OUTPATIENT)
Dept: OBGYN CLINIC | Facility: CLINIC | Age: 33
End: 2023-09-01
Payer: COMMERCIAL

## 2023-09-01 VITALS — SYSTOLIC BLOOD PRESSURE: 108 MMHG | WEIGHT: 217.8 LBS | BODY MASS INDEX: 38.58 KG/M2 | DIASTOLIC BLOOD PRESSURE: 68 MMHG

## 2023-09-01 DIAGNOSIS — Z3A.38 38 WEEKS GESTATION OF PREGNANCY: ICD-10-CM

## 2023-09-01 DIAGNOSIS — O26.893 RH NEGATIVE STATE IN ANTEPARTUM PERIOD, THIRD TRIMESTER: Primary | ICD-10-CM

## 2023-09-01 DIAGNOSIS — Z67.91 RH NEGATIVE STATE IN ANTEPARTUM PERIOD, THIRD TRIMESTER: Primary | ICD-10-CM

## 2023-09-01 LAB
SL AMB  POCT GLUCOSE, UA: NEGATIVE
SL AMB POCT URINE PROTEIN: NEGATIVE

## 2023-09-01 PROCEDURE — 81002 URINALYSIS NONAUTO W/O SCOPE: CPT | Performed by: PHYSICIAN ASSISTANT

## 2023-09-01 PROCEDURE — PNV: Performed by: PHYSICIAN ASSISTANT

## 2023-09-01 NOTE — PROGRESS NOTES
Routine Prenatal Visit  [address] [place] [address], [address], [facility], [address] [facility],8W    Assessment/Plan:  Becky is a 33y.o. year old  at 38w1d who presents for routine prenatal visit. 1. Rh negative state in antepartum period, third trimester    2. 38 weeks gestation of pregnancy  Assessment & Plan:  Continue routine prenatal care. Reviewed option of IOL at 39 weeks. Will check cervix at next appointment. Return to office for ob check in 1 week. Orders:  -     POCT urine dip      Next OB Visit 1 week. Subjective:     CC: Prenatal care    David Mchugh is a 33 y.o.  female who presents for routine prenatal care at 38w1d. Pregnancy ROS: Good FM, swelling in lower legs, improves with rest. No N/V, HA, cramping, VB, LOF, domestic violence, or smoking. Tolerating PNV. The following portions of the patient's history were reviewed and updated as appropriate: allergies, current medications, past family history, past medical history, obstetric history, gynecologic history, past social history, past surgical history and problem list.      Objective:  /68   Wt 98.8 kg (217 lb 12.8 oz)   LMP 2022 (Exact Date)   BMI 38.58 kg/m²   Pregravid Weight/BMI: 79.4 kg (175 lb) (BMI 31.01)  Current Weight: 98.8 kg (217 lb 12.8 oz)   Total Weight Gain: 19.4 kg (42 lb 12.8 oz)   Pre- Vitals    Flowsheet Row Most Recent Value   Prenatal Assessment    Fetal Heart Rate 146   Fundal Height (cm) 39 cm   Movement Present   Prenatal Vitals    Blood Pressure 108/68   Weight - Scale 98.8 kg (217 lb 12.8 oz)   Urine Albumin/Glucose    Dilation/Effacement/Station    Vaginal Drainage    Draining Fluid No   Edema    LLE Edema Trace   RLE Edema Trace   Facial Edema None           General: Well appearing, no distress  Abdomen: Soft, gravid, nontender  Fundal Height: Appropriate for gestational age. Extremities: Warm and well perfused. Non tender.

## 2023-09-01 NOTE — ASSESSMENT & PLAN NOTE
Continue routine prenatal care. Reviewed option of IOL at 39 weeks. Will check cervix at next appointment. Return to office for ob check in 1 week.

## 2023-09-06 ENCOUNTER — ROUTINE PRENATAL (OUTPATIENT)
Dept: OBGYN CLINIC | Facility: CLINIC | Age: 33
End: 2023-09-06
Payer: COMMERCIAL

## 2023-09-06 VITALS
BODY MASS INDEX: 39.12 KG/M2 | WEIGHT: 220.8 LBS | HEIGHT: 63 IN | DIASTOLIC BLOOD PRESSURE: 70 MMHG | SYSTOLIC BLOOD PRESSURE: 102 MMHG

## 2023-09-06 DIAGNOSIS — O26.893 RH NEGATIVE STATE IN ANTEPARTUM PERIOD, THIRD TRIMESTER: ICD-10-CM

## 2023-09-06 DIAGNOSIS — O99.013 ANEMIA AFFECTING PREGNANCY IN THIRD TRIMESTER: ICD-10-CM

## 2023-09-06 DIAGNOSIS — O99.213 OBESITY AFFECTING PREGNANCY IN THIRD TRIMESTER: ICD-10-CM

## 2023-09-06 DIAGNOSIS — Z3A.38 38 WEEKS GESTATION OF PREGNANCY: Primary | ICD-10-CM

## 2023-09-06 DIAGNOSIS — F41.9 ANXIETY: ICD-10-CM

## 2023-09-06 DIAGNOSIS — Z67.91 RH NEGATIVE STATE IN ANTEPARTUM PERIOD, THIRD TRIMESTER: ICD-10-CM

## 2023-09-06 DIAGNOSIS — O99.343 MENTAL DISORDER AFFECTING PREGNANCY IN THIRD TRIMESTER: ICD-10-CM

## 2023-09-06 LAB
SL AMB  POCT GLUCOSE, UA: NEGATIVE
SL AMB POCT URINE PROTEIN: NORMAL

## 2023-09-06 PROCEDURE — 81002 URINALYSIS NONAUTO W/O SCOPE: CPT | Performed by: OBSTETRICS & GYNECOLOGY

## 2023-09-06 PROCEDURE — PNV: Performed by: OBSTETRICS & GYNECOLOGY

## 2023-09-06 NOTE — PROGRESS NOTES
Routine Prenatal Visit  [address]  [address] [facility], [address], [place], [address] [facility]    Assessment/Plan:  Yunior Seth is a 33y.o. year old  at 37w9d who presents for routine prenatal visit. 1. 38 weeks gestation of pregnancy  -     POCT urine dip    2. Rh negative state in antepartum period, third trimester    3. Mental disorder affecting pregnancy in third trimester    4. Obesity affecting pregnancy in third trimester    5. BMI 30.0-30.9,adult    6. Anxiety    7. Anemia affecting pregnancy in third trimester        Next OB Visit 1 weeks. Subjective:     CC: Prenatal care    Zakiya Acosta is a 33 y.o.  female who presents for routine prenatal care at 38w6d. Pregnancy ROS: no leakage of fluid, pelvic pain, or vaginal bleeding. normal fetal movement. The following portions of the patient's history were reviewed and updated as appropriate: allergies, current medications, past family history, past medical history, obstetric history, gynecologic history, past social history, past surgical history and problem list.      Objective:  /70   Ht 5' 3" (1.6 m)   Wt 100 kg (220 lb 12.8 oz)   LMP 2022 (Exact Date)   BMI 39.11 kg/m²   Pregravid Weight/BMI: 79.4 kg (175 lb) (BMI 31.01)  Current Weight: 100 kg (220 lb 12.8 oz)   Total Weight Gain: 20.8 kg (45 lb 12.8 oz)   Pre-Christopher Vitals    Flowsheet Row Most Recent Value   Prenatal Assessment    Fetal Heart Rate 140   Fundal Height (cm) 40 cm   Movement Present   Prenatal Vitals    Blood Pressure 102/70   Weight - Scale 100 kg (220 lb 12.8 oz)   Urine Albumin/Glucose    Dilation/Effacement/Station    Vaginal Drainage    Draining Fluid No   Edema    LLE Edema Trace   RLE Edema Trace   Facial Edema Trace           General: Well appearing, no distress  Abdomen: Soft, gravid, nontender  Extremities: Non tender.

## 2023-09-13 ENCOUNTER — ROUTINE PRENATAL (OUTPATIENT)
Dept: OBGYN CLINIC | Facility: CLINIC | Age: 33
End: 2023-09-13
Payer: COMMERCIAL

## 2023-09-13 VITALS — WEIGHT: 222 LBS | SYSTOLIC BLOOD PRESSURE: 122 MMHG | BODY MASS INDEX: 39.33 KG/M2 | DIASTOLIC BLOOD PRESSURE: 78 MMHG

## 2023-09-13 DIAGNOSIS — Z3A.39 39 WEEKS GESTATION OF PREGNANCY: Primary | ICD-10-CM

## 2023-09-13 DIAGNOSIS — Z30.2 REQUEST FOR STERILIZATION: ICD-10-CM

## 2023-09-13 DIAGNOSIS — O99.343 MENTAL DISORDER AFFECTING PREGNANCY IN THIRD TRIMESTER: ICD-10-CM

## 2023-09-13 LAB
SL AMB  POCT GLUCOSE, UA: NORMAL
SL AMB POCT URINE PROTEIN: NORMAL

## 2023-09-13 PROCEDURE — 81002 URINALYSIS NONAUTO W/O SCOPE: CPT | Performed by: OBSTETRICS & GYNECOLOGY

## 2023-09-13 PROCEDURE — PNV: Performed by: OBSTETRICS & GYNECOLOGY

## 2023-09-13 NOTE — PROGRESS NOTES
Routine Prenatal Visit  [address]  [address] [place], [address], [place], [address] [facility]    Assessment/Plan:  Sophia Savage is a 33y.o. year old  at 36w10d who presents for routine prenatal visit. 1. 39 weeks gestation of pregnancy  -  Patient desires elective IOL after the weekend  Reviewed with patient that any pregnant women can undergo an elective induction of labor at 39 weeks or later, depending on hospital availability. One study showed that induction of labor of first time mothers at 43 weeks compared to waiting until 41 weeks, decreased  rate by 3%, and decreased risk of developing high blood pressure in pregnancy. Alternatively induction of labor can often result in longer in-hospital stays overall, than allowing for spontaneous labor. This is especially true in patients who have an unfavorable cervix and require the additional step of cervical ripening prior to induction of labor - leading often to an additional 12-24 hours in the hospital prior to delivery, during which there is continuous fetal monitoring. For all patients who have not gone into labor spontaneously, we recommend induction of labor between 40.6-41.6 weeks, due to increase risk of stillbirth. Pregnancies past 40.6 weeks who are not in the process of induction, we recommend  testing with NST and AL 2x/week. Discussed these pros and cons in relation to patient's desires for her delivery process, as well as the fact that she is a healthy patient with no other risk factors. She wishes to proceed with IOL on 23 with cervical ripening  -    POCT urine dip    2. Mental disorder affecting pregnancy in third trimester    3. Request for sterilization  Comments:  Pt desires bilateral salpingectomy if she has a         Next visit:  postpartum    Subjective:     CC: Prenatal care    Josue Zarate is a 33 y.o.  female who presents for routine prenatal care at 39w6d.   Pregnancy ROS: no leakage of fluid, pelvic pain, or vaginal bleeding. normal fetal movement. The following portions of the patient's history were reviewed and updated as appropriate: allergies, current medications, past family history, past medical history, obstetric history, gynecologic history, past social history, past surgical history and problem list.      Objective:  /78   Wt 101 kg (222 lb)   LMP 2022 (Exact Date)   BMI 39.33 kg/m²   Pregravid Weight/BMI: 79.4 kg (175 lb) (BMI 31.01)  Current Weight: 101 kg (222 lb)   Total Weight Gain: 21.3 kg (47 lb)   Pre- Vitals    Flowsheet Row Most Recent Value   Prenatal Assessment    Fetal Heart Rate 140   Fundal Height (cm) 40 cm   Movement Present   Prenatal Vitals    Blood Pressure 122/78   Weight - Scale 101 kg (222 lb)   Urine Albumin/Glucose    Dilation/Effacement/Station    Cervical Dilation . 5   Cervical Effacement 50   Fetal Station -3   Vaginal Drainage    Draining Fluid No   Edema            General: Well appearing, no distress  Abdomen: Soft, gravid, nontender  Extremities: Non tender.

## 2023-09-17 ENCOUNTER — HOSPITAL ENCOUNTER (INPATIENT)
Facility: HOSPITAL | Age: 33
LOS: 2 days | Discharge: HOME | End: 2023-09-19
Attending: STUDENT IN AN ORGANIZED HEALTH CARE EDUCATION/TRAINING PROGRAM | Admitting: STUDENT IN AN ORGANIZED HEALTH CARE EDUCATION/TRAINING PROGRAM
Payer: COMMERCIAL

## 2023-09-17 ENCOUNTER — HOSPITAL ENCOUNTER (OUTPATIENT)
Dept: LABOR AND DELIVERY | Facility: HOSPITAL | Age: 33
Discharge: HOME | End: 2023-09-17
Payer: COMMERCIAL

## 2023-09-17 DIAGNOSIS — Z34.90 ENCOUNTER FOR ELECTIVE INDUCTION OF LABOR: Primary | ICD-10-CM

## 2023-09-17 LAB
ABO GROUP BLD: NORMAL
BASOPHILS # BLD AUTO: 0.03 THOUSANDS/ÂΜL (ref 0–0.1)
BASOPHILS NFR BLD AUTO: 0 % (ref 0–1)
BLD GP AB SCN SERPL QL: POSITIVE
BLOOD GROUP ANTIBODIES SERPL: NORMAL
EOSINOPHIL # BLD AUTO: 0.14 THOUSAND/ÂΜL (ref 0–0.61)
EOSINOPHIL NFR BLD AUTO: 2 % (ref 0–6)
ERYTHROCYTE [DISTWIDTH] IN BLOOD BY AUTOMATED COUNT: 12.9 % (ref 11.6–15.1)
HCT VFR BLD AUTO: 35.8 % (ref 34.8–46.1)
HGB BLD-MCNC: 12.1 G/DL (ref 11.5–15.4)
HOLD SPECIMEN: NORMAL
IMM GRANULOCYTES # BLD AUTO: 0.03 THOUSAND/UL (ref 0–0.2)
IMM GRANULOCYTES NFR BLD AUTO: 0 % (ref 0–2)
LYMPHOCYTES # BLD AUTO: 2.43 THOUSANDS/ÂΜL (ref 0.6–4.47)
LYMPHOCYTES NFR BLD AUTO: 26 % (ref 14–44)
MCH RBC QN AUTO: 30.1 PG (ref 26.8–34.3)
MCHC RBC AUTO-ENTMCNC: 33.8 G/DL (ref 31.4–37.4)
MCV RBC AUTO: 89 FL (ref 82–98)
MONOCYTES # BLD AUTO: 0.65 THOUSAND/ÂΜL (ref 0.17–1.22)
MONOCYTES NFR BLD AUTO: 7 % (ref 4–12)
NEUTROPHILS # BLD AUTO: 6.14 THOUSANDS/ÂΜL (ref 1.85–7.62)
NEUTS SEG NFR BLD AUTO: 65 % (ref 43–75)
NRBC BLD AUTO-RTO: 0 /100 WBCS
PLATELET # BLD AUTO: 206 THOUSANDS/UL (ref 149–390)
PMV BLD AUTO: 11.9 FL (ref 8.9–12.7)
RBC # BLD AUTO: 4.02 MILLION/UL (ref 3.81–5.12)
RH BLD: NEGATIVE
SPECIMEN EXPIRATION DATE: NORMAL
WBC # BLD AUTO: 9.42 THOUSAND/UL (ref 4.31–10.16)

## 2023-09-17 PROCEDURE — 85025 COMPLETE CBC W/AUTO DIFF WBC: CPT | Performed by: OBSTETRICS & GYNECOLOGY

## 2023-09-17 PROCEDURE — 86870 RBC ANTIBODY IDENTIFICATION: CPT | Performed by: STUDENT IN AN ORGANIZED HEALTH CARE EDUCATION/TRAINING PROGRAM

## 2023-09-17 PROCEDURE — NC001 PR NO CHARGE: Performed by: OBSTETRICS & GYNECOLOGY

## 2023-09-17 PROCEDURE — 86900 BLOOD TYPING SEROLOGIC ABO: CPT | Performed by: OBSTETRICS & GYNECOLOGY

## 2023-09-17 PROCEDURE — 86780 TREPONEMA PALLIDUM: CPT | Performed by: OBSTETRICS & GYNECOLOGY

## 2023-09-17 PROCEDURE — 86901 BLOOD TYPING SEROLOGIC RH(D): CPT | Performed by: OBSTETRICS & GYNECOLOGY

## 2023-09-17 PROCEDURE — 86850 RBC ANTIBODY SCREEN: CPT | Performed by: OBSTETRICS & GYNECOLOGY

## 2023-09-17 RX ORDER — SODIUM CHLORIDE, SODIUM LACTATE, POTASSIUM CHLORIDE, CALCIUM CHLORIDE 600; 310; 30; 20 MG/100ML; MG/100ML; MG/100ML; MG/100ML
125 INJECTION, SOLUTION INTRAVENOUS CONTINUOUS
Status: DISCONTINUED | OUTPATIENT
Start: 2023-09-17 | End: 2023-09-18

## 2023-09-17 RX ORDER — ONDANSETRON 2 MG/ML
4 INJECTION INTRAMUSCULAR; INTRAVENOUS EVERY 6 HOURS PRN
Status: DISCONTINUED | OUTPATIENT
Start: 2023-09-17 | End: 2023-09-18

## 2023-09-17 RX ORDER — OXYTOCIN/RINGER'S LACTATE 30/500 ML
1-30 PLASTIC BAG, INJECTION (ML) INTRAVENOUS
Status: DISCONTINUED | OUTPATIENT
Start: 2023-09-17 | End: 2023-09-18

## 2023-09-17 RX ORDER — BUPIVACAINE HYDROCHLORIDE 2.5 MG/ML
30 INJECTION, SOLUTION EPIDURAL; INFILTRATION; INTRACAUDAL ONCE AS NEEDED
Status: DISCONTINUED | OUTPATIENT
Start: 2023-09-17 | End: 2023-09-18

## 2023-09-17 RX ADMIN — SODIUM CHLORIDE, SODIUM LACTATE, POTASSIUM CHLORIDE, AND CALCIUM CHLORIDE 125 ML/HR: .6; .31; .03; .02 INJECTION, SOLUTION INTRAVENOUS at 20:54

## 2023-09-17 RX ADMIN — SODIUM CHLORIDE, SODIUM LACTATE, POTASSIUM CHLORIDE, AND CALCIUM CHLORIDE 125 ML/HR: .6; .31; .03; .02 INJECTION, SOLUTION INTRAVENOUS at 22:44

## 2023-09-17 RX ADMIN — Medication 2 MILLI-UNITS/MIN: at 22:15

## 2023-09-18 ENCOUNTER — ANESTHESIA (INPATIENT)
Dept: ANESTHESIOLOGY | Facility: HOSPITAL | Age: 33
End: 2023-09-18
Payer: COMMERCIAL

## 2023-09-18 ENCOUNTER — ANESTHESIA EVENT (INPATIENT)
Dept: ANESTHESIOLOGY | Facility: HOSPITAL | Age: 33
End: 2023-09-18
Payer: COMMERCIAL

## 2023-09-18 LAB
BASE EXCESS BLDCOA CALC-SCNC: -15.7 MMOL/L (ref 3–11)
BASE EXCESS BLDCOV CALC-SCNC: -11.5 MMOL/L (ref 1–9)
HCO3 BLDCOA-SCNC: 17.9 MMOL/L (ref 17.3–27.3)
HCO3 BLDCOV-SCNC: 20 MMOL/L (ref 12.2–28.6)
O2 CT VFR BLDCOA CALC: 2.2 ML/DL
OXYHGB MFR BLDCOA: 9.5 %
OXYHGB MFR BLDCOV: 22 %
PCO2 BLDCOA: 80.7 MM[HG] (ref 30–60)
PCO2 BLDCOV: 70.2 MM HG (ref 27–43)
PH BLDCOA: 6.96 [PH] (ref 7.23–7.43)
PH BLDCOV: 7.07 [PH] (ref 7.19–7.49)
PO2 BLDCOA: 12.1 MM HG (ref 5–25)
PO2 BLDCOV: 22.4 MM HG (ref 15–45)
SAO2 % BLDCOV: 5.1 ML/DL
TREPONEMA PALLIDUM IGG+IGM AB [PRESENCE] IN SERUM OR PLASMA BY IMMUNOASSAY: NORMAL

## 2023-09-18 PROCEDURE — 88307 TISSUE EXAM BY PATHOLOGIST: CPT | Performed by: PATHOLOGY

## 2023-09-18 PROCEDURE — 0KQM0ZZ REPAIR PERINEUM MUSCLE, OPEN APPROACH: ICD-10-PCS | Performed by: OBSTETRICS & GYNECOLOGY

## 2023-09-18 PROCEDURE — 59400 OBSTETRICAL CARE: CPT | Performed by: OBSTETRICS & GYNECOLOGY

## 2023-09-18 PROCEDURE — 82805 BLOOD GASES W/O2 SATURATION: CPT | Performed by: OBSTETRICS & GYNECOLOGY

## 2023-09-18 RX ORDER — LIDOCAINE HYDROCHLORIDE 20 MG/ML
INJECTION, SOLUTION EPIDURAL; INFILTRATION; INTRACAUDAL; PERINEURAL AS NEEDED
Status: DISCONTINUED | OUTPATIENT
Start: 2023-09-18 | End: 2023-09-18 | Stop reason: HOSPADM

## 2023-09-18 RX ORDER — SIMETHICONE 80 MG
80 TABLET,CHEWABLE ORAL 4 TIMES DAILY PRN
Status: DISCONTINUED | OUTPATIENT
Start: 2023-09-18 | End: 2023-09-19 | Stop reason: HOSPADM

## 2023-09-18 RX ORDER — ACETAMINOPHEN 325 MG/1
650 TABLET ORAL EVERY 4 HOURS PRN
Status: DISCONTINUED | OUTPATIENT
Start: 2023-09-18 | End: 2023-09-19 | Stop reason: HOSPADM

## 2023-09-18 RX ORDER — LIDOCAINE HYDROCHLORIDE AND EPINEPHRINE 15; 5 MG/ML; UG/ML
INJECTION, SOLUTION EPIDURAL AS NEEDED
Status: DISCONTINUED | OUTPATIENT
Start: 2023-09-18 | End: 2023-09-18 | Stop reason: HOSPADM

## 2023-09-18 RX ORDER — ROPIVACAINE HYDROCHLORIDE 2 MG/ML
INJECTION, SOLUTION EPIDURAL; INFILTRATION; PERINEURAL AS NEEDED
Status: DISCONTINUED | OUTPATIENT
Start: 2023-09-18 | End: 2023-09-18 | Stop reason: HOSPADM

## 2023-09-18 RX ORDER — LIDOCAINE HYDROCHLORIDE 10 MG/ML
INJECTION, SOLUTION EPIDURAL; INFILTRATION; INTRACAUDAL; PERINEURAL AS NEEDED
Status: DISCONTINUED | OUTPATIENT
Start: 2023-09-18 | End: 2023-09-18 | Stop reason: HOSPADM

## 2023-09-18 RX ORDER — NALBUPHINE HYDROCHLORIDE 10 MG/ML
10 INJECTION, SOLUTION INTRAMUSCULAR; INTRAVENOUS; SUBCUTANEOUS EVERY 4 HOURS PRN
Status: DISCONTINUED | OUTPATIENT
Start: 2023-09-18 | End: 2023-09-18

## 2023-09-18 RX ORDER — OXYCODONE HYDROCHLORIDE 5 MG/1
5 TABLET ORAL
Status: DISCONTINUED | OUTPATIENT
Start: 2023-09-18 | End: 2023-09-19 | Stop reason: HOSPADM

## 2023-09-18 RX ORDER — OXYTOCIN/RINGER'S LACTATE 30/500 ML
62.5 PLASTIC BAG, INJECTION (ML) INTRAVENOUS CONTINUOUS
Status: ACTIVE | OUTPATIENT
Start: 2023-09-18 | End: 2023-09-19

## 2023-09-18 RX ORDER — DOCUSATE SODIUM 100 MG/1
100 CAPSULE, LIQUID FILLED ORAL 2 TIMES DAILY
Status: DISCONTINUED | OUTPATIENT
Start: 2023-09-18 | End: 2023-09-19 | Stop reason: HOSPADM

## 2023-09-18 RX ORDER — OXYTOCIN/RINGER'S LACTATE 30/500 ML
250 PLASTIC BAG, INJECTION (ML) INTRAVENOUS ONCE
Status: DISCONTINUED | OUTPATIENT
Start: 2023-09-18 | End: 2023-09-19 | Stop reason: HOSPADM

## 2023-09-18 RX ORDER — DIAPER,BRIEF,INFANT-TODD,DISP
1 EACH MISCELLANEOUS DAILY PRN
Status: DISCONTINUED | OUTPATIENT
Start: 2023-09-18 | End: 2023-09-19 | Stop reason: HOSPADM

## 2023-09-18 RX ORDER — CALCIUM CARBONATE 500 MG/1
1000 TABLET, CHEWABLE ORAL DAILY PRN
Status: DISCONTINUED | OUTPATIENT
Start: 2023-09-18 | End: 2023-09-19 | Stop reason: HOSPADM

## 2023-09-18 RX ORDER — IBUPROFEN 600 MG/1
600 TABLET ORAL EVERY 6 HOURS PRN
Status: DISCONTINUED | OUTPATIENT
Start: 2023-09-18 | End: 2023-09-19 | Stop reason: HOSPADM

## 2023-09-18 RX ADMIN — SODIUM CHLORIDE, SODIUM LACTATE, POTASSIUM CHLORIDE, AND CALCIUM CHLORIDE 125 ML/HR: .6; .31; .03; .02 INJECTION, SOLUTION INTRAVENOUS at 14:48

## 2023-09-18 RX ADMIN — NALBUPHINE HYDROCHLORIDE 10 MG: 10 INJECTION, SOLUTION INTRAMUSCULAR; INTRAVENOUS; SUBCUTANEOUS at 00:25

## 2023-09-18 RX ADMIN — DOCUSATE SODIUM 100 MG: 100 CAPSULE, LIQUID FILLED ORAL at 17:27

## 2023-09-18 RX ADMIN — SODIUM CHLORIDE, SODIUM LACTATE, POTASSIUM CHLORIDE, AND CALCIUM CHLORIDE 125 ML/HR: .6; .31; .03; .02 INJECTION, SOLUTION INTRAVENOUS at 00:13

## 2023-09-18 RX ADMIN — NALBUPHINE HYDROCHLORIDE 10 MG: 10 INJECTION, SOLUTION INTRAMUSCULAR; INTRAVENOUS; SUBCUTANEOUS at 05:32

## 2023-09-18 RX ADMIN — ROPIVACAINE HYDROCHLORIDE: 2 INJECTION, SOLUTION EPIDURAL; INFILTRATION at 09:30

## 2023-09-18 RX ADMIN — ROPIVACAINE HYDROCHLORIDE 5 ML: 2 INJECTION, SOLUTION EPIDURAL; INFILTRATION at 09:11

## 2023-09-18 RX ADMIN — LIDOCAINE HYDROCHLORIDE 3 ML: 10 INJECTION, SOLUTION EPIDURAL; INFILTRATION; INTRACAUDAL; PERINEURAL at 09:07

## 2023-09-18 RX ADMIN — ONDANSETRON 4 MG: 2 INJECTION INTRAMUSCULAR; INTRAVENOUS at 11:01

## 2023-09-18 RX ADMIN — ROPIVACAINE HYDROCHLORIDE 5 ML: 2 INJECTION, SOLUTION EPIDURAL; INFILTRATION at 09:15

## 2023-09-18 RX ADMIN — LIDOCAINE HYDROCHLORIDE 5 ML: 20 INJECTION, SOLUTION EPIDURAL; INFILTRATION; INTRACAUDAL; PERINEURAL at 14:49

## 2023-09-18 RX ADMIN — LIDOCAINE HYDROCHLORIDE AND EPINEPHRINE 3 ML: 15; 5 INJECTION, SOLUTION EPIDURAL at 09:10

## 2023-09-18 RX ADMIN — SODIUM CHLORIDE, SODIUM LACTATE, POTASSIUM CHLORIDE, AND CALCIUM CHLORIDE 125 ML/HR: .6; .31; .03; .02 INJECTION, SOLUTION INTRAVENOUS at 05:31

## 2023-09-18 RX ADMIN — ACETAMINOPHEN 650 MG: 325 TABLET, FILM COATED ORAL at 17:27

## 2023-09-18 RX ADMIN — LIDOCAINE HYDROCHLORIDE 5 ML: 20 INJECTION, SOLUTION EPIDURAL; INFILTRATION; INTRACAUDAL; PERINEURAL at 14:56

## 2023-09-18 RX ADMIN — Medication 62.5 MILLI-UNITS/MIN: at 17:30

## 2023-09-18 RX ADMIN — IBUPROFEN 600 MG: 600 TABLET, FILM COATED ORAL at 17:27

## 2023-09-18 NOTE — OB LABOR/OXYTOCIN SAFETY PROGRESS
Labor Progress Note - Hollymead Reasoner 33 y.o. female MRN: [id number]    Unit/Bed#: -01 Encounter: [id number]    Dose (bayron-units/min) Oxytocin: 6 bayron-units/min  Contraction Frequency (minutes): irregular; RN to adjust toco  Contraction Quality: Mild  Tachysystole: No   Cervical Dilation: 6        Cervical Effacement: 60  Fetal Station: -3  Baseline Rate: 135 bpm  Fetal Heart Rate: 131 BPM  FHR Category: 1               Vital Signs:   Vitals:    09/18/23 0618   BP: 99/56   Pulse:    Resp:    Temp:    SpO2:        Notes/comments: Patient is comfortable without pain medication at this time. Discussed option for epidural and she does plan on getting one. Recommend that she get one placed before the next exam as may try to AROM (to high at this time).         Sohan Escamilla MD 9/18/2023 8:10 AM

## 2023-09-18 NOTE — OB LABOR/OXYTOCIN SAFETY PROGRESS
Oxytocin Safety Progress Check Note - Shima Donovan 33 y.o. female MRN: [id number]    Unit/Bed#: -01 Encounter: [id number]       Contraction Frequency (minutes): 1-5  Contraction Quality: Mild      Cervical Dilation: 3        Cervical Effacement: 30  Fetal Station: -2  Baseline Rate: 150 bpm     FHR Category: 1               Vital Signs:   Vitals:    09/17/23 2051   BP: 124/72   Pulse: 94   Resp: 18   Temp: 98.3 °F (36.8 °C)   SpO2: 99%       Notes/comments:         Harrison Selby DO 9/17/2023 9:51 PM

## 2023-09-18 NOTE — PLAN OF CARE
Problem: BIRTH - VAGINAL/ SECTION  Goal: Fetal and maternal status remain reassuring during the birth process  Description: INTERVENTIONS:  - Monitor vital signs  - Monitor fetal heart rate  - Monitor uterine activity  - Monitor labor progression (vaginal delivery)  - DVT prophylaxis  - Antibiotic prophylaxis  2023 by Yang De Leon RN  Outcome: Progressing  2023 by Yang De Leon RN  Outcome: Progressing  Goal: Emotionally satisfying birthing experience for mother/fetus  Description: Interventions:  - Assess, plan, implement and evaluate the nursing care given to the patient in labor  - Advocate the philosophy that each childbirth experience is a unique experience and support the family's chosen level of involvement and control during the labor process   - Actively participate in both the patient's and family's teaching of the birth process  - Consider cultural, Judaism and age-specific factors and plan care for the patient in labor  2023 by Yang De Leon RN  Outcome: Progressing  2023 by Yang De Leon RN  Outcome: Progressing     Problem: Knowledge Deficit  Goal: Verbalizes understanding of labor plan  Description: Assess patient/family/caregiver's baseline knowledge level and ability to understand information. Provide education via patient/family/caregiver's preferred learning method at appropriate level of understanding. 1. Provide teaching at level of understanding. 2. Provide teaching via preferred learning method(s). 2023 by Yang De Leon RN  Outcome: Progressing  2023 by Yang De Leon RN  Outcome: Progressing  Goal: Patient/family/caregiver demonstrates understanding of disease process, treatment plan, medications, and discharge instructions  Description: Complete learning assessment and assess knowledge base.   Interventions:  - Provide teaching at level of understanding  - Provide teaching via preferred learning methods  2023 2324 by Sierra Eng RN  Outcome: Progressing  9/17/2023 2323 by Sierra Eng RN  Outcome: Progressing     Problem: Labor & Delivery  Goal: Manages discomfort  Description: Assess and monitor for signs and symptoms of discomfort. Assess patient's pain level regularly and per hospital policy. Administer medications as ordered. Support use of nonpharmacological methods to help control pain such as distraction, imagery, relaxation, and application of heat and cold. Collaborate with interdisciplinary team and patient to determine appropriate pain management plan. 1. Include patient in decisions related to comfort. 2. Offer non-pharmacological pain management interventions. 3. Report ineffective pain management to physician. 9/17/2023 2324 by Sierra Eng RN  Outcome: Progressing  9/17/2023 2323 by Sierra Eng RN  Outcome: Progressing  Goal: Patient vital signs are stable  Description: 1. Assess vital signs - vaginal delivery.   9/17/2023 2324 by Sierra Eng RN  Outcome: Progressing  9/17/2023 2323 by Sierra Eng RN  Outcome: Progressing     Problem: PAIN - ADULT  Goal: Verbalizes/displays adequate comfort level or baseline comfort level  Description: Interventions:  - Encourage patient to monitor pain and request assistance  - Assess pain using appropriate pain scale  - Administer analgesics based on type and severity of pain and evaluate response  - Implement non-pharmacological measures as appropriate and evaluate response  - Consider cultural and social influences on pain and pain management  - Notify physician/advanced practitioner if interventions unsuccessful or patient reports new pain  9/17/2023 2324 by Sierra Eng RN  Outcome: Progressing  9/17/2023 2323 by Sierra Eng RN  Outcome: Progressing     Problem: INFECTION - ADULT  Goal: Absence or prevention of progression during hospitalization  Description: INTERVENTIONS:  - Assess and monitor for signs and symptoms of infection  - Monitor lab/diagnostic results  - Monitor all insertion sites, i.e. indwelling lines, tubes, and drains  - Monitor endotracheal if appropriate and nasal secretions for changes in amount and color  - [place] appropriate cooling/warming therapies per order  - Administer medications as ordered  - Instruct and encourage patient and family to use good hand hygiene technique  - Identify and instruct in appropriate isolation precautions for identified infection/condition  9/17/2023 2324 by Jennifer Simeon RN  Outcome: Progressing  9/17/2023 2323 by Jennifer Simeon RN  Outcome: Progressing  Goal: Absence of fever/infection during neutropenic period  Description: INTERVENTIONS:  - Monitor WBC    9/17/2023 2324 by Jennifer Simeon RN  Outcome: Progressing  9/17/2023 2323 by Jennifer Simeon RN  Outcome: Progressing     Problem: SAFETY ADULT  Goal: Patient will remain free of falls  Description: INTERVENTIONS:  - Educate patient/family on patient safety including physical limitations  - Instruct patient to call for assistance with activity   - Consult OT/PT to assist with strengthening/mobility   - Keep Call bell within reach  - Keep bed low and locked with side rails adjusted as appropriate  - Keep care items and personal belongings within reach  - Initiate and maintain comfort rounds  - Make Fall Risk Sign visible to staff  - Offer Toileting every  Hours, in advance of need  - Initiate/Maintain alarm  - Obtain necessary fall risk management equipment:   - Apply yellow socks and bracelet for high fall risk patients  - Consider moving patient to room near nurses station  9/17/2023 2324 by Jennifer Simeon RN  Outcome: Progressing  9/17/2023 2323 by Jennifer Simeon RN  Outcome: Progressing  Goal: Maintain or return to baseline ADL function  Description: INTERVENTIONS:  -  Assess patient's ability to carry out ADLs; assess patient's baseline for ADL function and identify physical deficits which impact ability to perform ADLs (bathing, care of mouth/teeth, toileting, grooming, dressing, etc.)  - Assess/evaluate cause of self-care deficits   - Assess range of motion  - Assess patient's mobility; develop plan if impaired  - Assess patient's need for assistive devices and provide as appropriate  - Encourage maximum independence but intervene and supervise when necessary  - Involve family in performance of ADLs  - Assess for home care needs following discharge   - Consider OT consult to assist with ADL evaluation and planning for discharge  - Provide patient education as appropriate  9/17/2023 2324 by Sierra Eng RN  Outcome: Progressing  9/17/2023 2323 by Seirra Eng RN  Outcome: Progressing  Goal: Maintains/Returns to pre admission functional level  Description: INTERVENTIONS:  - Perform BMAT or MOVE assessment daily.   - Set and communicate daily mobility goal to care team and patient/family/caregiver. - Collaborate with rehabilitation services on mobility goals if consulted  - Perform Range of Motion  times a day. - Reposition patient every  hours.   - Dangle patient  times a day  - Stand patient  times a day  - Ambulate patient  times a day  - Out of bed to chair times a day   - Out of bed for meals  times a day  - Out of bed for toileting  - Record patient progress and toleration of activity level   9/17/2023 2324 by Sierra Eng RN  Outcome: Progressing  9/17/2023 2323 by Sierra Eng RN  Outcome: Progressing     Problem: DISCHARGE PLANNING  Goal: Discharge to home or other facility with appropriate resources  Description: INTERVENTIONS:  - Identify barriers to discharge w/patient and caregiver  - Arrange for needed discharge resources and transportation as appropriate  - Identify discharge learning needs (meds, wound care, etc.)  - Arrange for interpretive services to assist at discharge as needed  - Refer to Case Management Department for coordinating discharge planning if the patient needs post-hospital services based on physician/advanced practitioner order or complex needs related to functional status, cognitive ability, or social support system  9/17/2023 2324 by Reji Ron RN  Outcome: Progressing  9/17/2023 2323 by Reji Ron RN  Outcome: Progressing     Problem: DISCHARGE PLANNING - CARE MANAGEMENT  Goal: Discharge to post-acute care or home with appropriate resources  Description: INTERVENTIONS:  - Conduct assessment to determine patient/family and health care team treatment goals, and need for post-acute services based on payer coverage, community resources, and patient preferences, and barriers to discharge  - Address psychosocial, clinical, and financial barriers to discharge as identified in assessment in conjunction with the patient/family and health care team  - Arrange appropriate level of post-acute services according to patient’s   needs and preference and payer coverage in collaboration with the physician and health care team  - Communicate with and update the patient/family, physician, and health care team regarding progress on the discharge plan  - Arrange appropriate transportation to post-acute venues  9/17/2023 2324 by Reji Ron RN  Outcome: Progressing  9/17/2023 2323 by Reji Ron RN  Outcome: Progressing

## 2023-09-18 NOTE — ANESTHESIA PROCEDURE NOTES
Epidural Block    Patient location during procedure: OB  Start time: 9/18/2023 9:10 AM  Reason for block: at surgeon's request and primary anesthetic  Staffing  Performed by: Skye Clement MD  Authorized by: Skye Clement MD    Preanesthetic Checklist  Completed: patient identified, IV checked, risks and benefits discussed, surgical consent, monitors and equipment checked, pre-op evaluation and timeout performed  Epidural  Patient position: sitting  Prep: ChloraPrep  Patient monitoring: frequent blood pressure checks and continuous pulse ox  Approach: midline  Location: lumbar  Injection technique: BARBARA air  Needle  Needle type: Tuohy   Needle gauge: 18 G  Catheter type: end hole  Catheter size: 20 G  Catheter at skin depth: 10 cm  Catheter securement method: stabilization device  Test dose: negative  Assessment  Number of attempts: 1negative aspiration for CSF, negative aspiration for heme and no paresthesia on injection  patient tolerated the procedure well with no immediate complications

## 2023-09-18 NOTE — PLAN OF CARE
Problem: POSTPARTUM  Goal: Experiences normal postpartum course  Description: INTERVENTIONS:  - Monitor maternal vital signs  - Assess uterine involution and lochia  Outcome: Progressing  Goal: Appropriate maternal -  bonding  Description: INTERVENTIONS:  - Identify family support  - Assess for appropriate maternal/infant bonding   -Encourage maternal/infant bonding opportunities  - Referral to  or  as needed  Outcome: Progressing  Goal: Establishment of infant feeding pattern  Description: INTERVENTIONS:  - Assess breast/bottle feeding  - Refer to lactation as needed  Outcome: Progressing  Goal: Incision(s), wounds(s) or drain site(s) healing without S/S of infection  Description: INTERVENTIONS  - Assess and document dressing, incision, wound bed, drain sites and surrounding tissue  - Provide patient and family education  - Perform skin care/dressing changes every   Outcome: Progressing     Problem: PAIN - ADULT  Goal: Verbalizes/displays adequate comfort level or baseline comfort level  Description: Interventions:  - Encourage patient to monitor pain and request assistance  - Assess pain using appropriate pain scale  - Administer analgesics based on type and severity of pain and evaluate response  - Implement non-pharmacological measures as appropriate and evaluate response  - Consider cultural and social influences on pain and pain management  - Notify physician/advanced practitioner if interventions unsuccessful or patient reports new pain  2023 by Roel Reddy RN  Outcome: Progressing  2023 by Roel Reddy RN  Outcome: Progressing     Problem: INFECTION - ADULT  Goal: Absence or prevention of progression during hospitalization  Description: INTERVENTIONS:  - Assess and monitor for signs and symptoms of infection  - Monitor lab/diagnostic results  - Monitor all insertion sites, i.e. indwelling lines, tubes, and drains  - Monitor endotracheal if appropriate and nasal secretions for changes in amount and color  - [place] appropriate cooling/warming therapies per order  - Administer medications as ordered  - Instruct and encourage patient and family to use good hand hygiene technique  - Identify and instruct in appropriate isolation precautions for identified infection/condition  9/18/2023 1907 by Jesenia Alston RN  Outcome: Progressing  9/18/2023 1905 by Jesenia Alston RN  Outcome: Progressing  Goal: Absence of fever/infection during neutropenic period  Description: INTERVENTIONS:  - Monitor WBC    9/18/2023 1907 by Jesenia Alston RN  Outcome: Progressing  9/18/2023 1905 by Jesenia Alston RN  Outcome: Progressing     Problem: SAFETY ADULT  Goal: Patient will remain free of falls  Description: INTERVENTIONS:  - Educate patient/family on patient safety including physical limitations  - Instruct patient to call for assistance with activity   - Consult OT/PT to assist with strengthening/mobility   - Keep Call bell within reach  - Keep bed low and locked with side rails adjusted as appropriate  - Keep care items and personal belongings within reach  - Initiate and maintain comfort rounds  - Make Fall Risk Sign visible to staff  - Offer Toileting every  Hours, in advance of need  - Initiate/Maintain alarm  - Obtain necessary fall risk management equipment:   - Apply yellow socks and bracelet for high fall risk patients  - Consider moving patient to room near nurses station  9/18/2023 1907 by Jesenia Alston RN  Outcome: Progressing  9/18/2023 1905 by Jesenia Alston RN  Outcome: Progressing  Goal: Maintain or return to baseline ADL function  Description: INTERVENTIONS:  -  Assess patient's ability to carry out ADLs; assess patient's baseline for ADL function and identify physical deficits which impact ability to perform ADLs (bathing, care of mouth/teeth, toileting, grooming, dressing, etc.)  - Assess/evaluate cause of self-care deficits   - Assess range of motion  - Assess patient's mobility; develop plan if impaired  - Assess patient's need for assistive devices and provide as appropriate  - Encourage maximum independence but intervene and supervise when necessary  - Involve family in performance of ADLs  - Assess for home care needs following discharge   - Consider OT consult to assist with ADL evaluation and planning for discharge  - Provide patient education as appropriate  9/18/2023 1907 by Megan Rapp RN  Outcome: Progressing  9/18/2023 1905 by Megan Rapp RN  Outcome: Progressing  Goal: Maintains/Returns to pre admission functional level  Description: INTERVENTIONS:  - Perform BMAT or MOVE assessment daily.   - Set and communicate daily mobility goal to care team and patient/family/caregiver. - Collaborate with rehabilitation services on mobility goals if consulted  - Perform Range of Motion  times a day. - Reposition patient every  hours.   - Dangle patient  times a day  - Stand patient  times a day  - Ambulate patient  times a day  - Out of bed to chair  times a day   - Out of bed for meals times a day  - Out of bed for toileting  - Record patient progress and toleration of activity level   9/18/2023 1907 by Megan Rapp RN  Outcome: Progressing  9/18/2023 1905 by Megan Rapp RN  Outcome: Progressing     Problem: DISCHARGE PLANNING  Goal: Discharge to home or other facility with appropriate resources  Description: INTERVENTIONS:  - Identify barriers to discharge w/patient and caregiver  - Arrange for needed discharge resources and transportation as appropriate  - Identify discharge learning needs (meds, wound care, etc.)  - Arrange for interpretive services to assist at discharge as needed  - Refer to Case Management Department for coordinating discharge planning if the patient needs post-hospital services based on physician/advanced practitioner order or complex needs related to functional status, cognitive ability, or social support system  9/18/2023 1907 by Thang Payne RN  Outcome: Progressing  9/18/2023 1905 by Thang Payne RN  Outcome: Progressing     Problem: DISCHARGE PLANNING - CARE MANAGEMENT  Goal: Discharge to post-acute care or home with appropriate resources  Description: INTERVENTIONS:  - Conduct assessment to determine patient/family and health care team treatment goals, and need for post-acute services based on payer coverage, community resources, and patient preferences, and barriers to discharge  - Address psychosocial, clinical, and financial barriers to discharge as identified in assessment in conjunction with the patient/family and health care team  - Arrange appropriate level of post-acute services according to patient’s   needs and preference and payer coverage in collaboration with the physician and health care team  - Communicate with and update the patient/family, physician, and health care team regarding progress on the discharge plan  - Arrange appropriate transportation to post-acute venues  9/18/2023 1907 by Thang Payne RN  Outcome: Progressing  9/18/2023 1905 by Thang Payne RN  Outcome: Progressing

## 2023-09-18 NOTE — OB LABOR/OXYTOCIN SAFETY PROGRESS
Oxytocin Safety Progress Check Note - Ting Marker 33 y.o. female MRN: [id number]    Unit/Bed#: -01 Encounter: [id number]    Dose (bayron-units/min) Oxytocin: 0 bayron-units/min (stopped per Dr. Harinder Freeman)  Contraction Frequency (minutes): 2-5.5  Contraction Quality: Mild  Tachysystole: No   Cervical Dilation: 4        Cervical Effacement: 30  Fetal Station: -3  Baseline Rate: 135 bpm  Fetal Heart Rate: 140 BPM  FHR Category: 1               Vital Signs:   Vitals:    09/18/23 0234   BP: 115/69   Pulse: 73   Resp: 18   Temp: 98.1 °F (36.7 °C)   SpO2: 99%       Notes/comments:       FHT now in normal range  No change in cervix  S/p nubain feeling better  Ctx continue q 2 min without pitocin  Viridiana Davey DO 9/18/2023 2:56 AM

## 2023-09-18 NOTE — H&P
OB H&P  Nj Appiah  1990  /-      33 y.o. yo  at 40 weeks by us and lmp    Chief complaint:  eIOL    HPI:  Contractions:  Yes on monitor not felt by patient  Fetal movement:  yes  Vaginal bleeding:   no  Leaking of fluid:  no      Pregnancy Complications:  Patient Active Problem List   Diagnosis   • Mental disorder affecting pregnancy in third trimester   • Anxiety   • Obesity affecting pregnancy in third trimester   • 39 weeks gestation of pregnancy   • BMI 30.0-30.9,adult   • Rh negative state in antepartum period, third trimester   • Anemia affecting pregnancy in third trimester   • Encounter for elective induction of labor   • Request for sterilization         Past Medical History:  Past Medical History:   Diagnosis Date   • Anxiety    • Depression     History of usign Lexapro until mid pregnancy in 2019 and stopped.  Currently self-managed and no medication   • History of gallstones 2019    Cholecstectomy 19   • Panic attack        Past Surgical History:  Past Surgical History:   Procedure Laterality Date   • CHOLECYSTECTOMY  2019       Social Hx:  Social History     Socioeconomic History   • Marital status: /Civil Union     Spouse name: Not on file   • Number of children: Not on file   • Years of education: Not on file   • Highest education level: Not on file   Occupational History   • Not on file   Tobacco Use   • Smoking status: Never     Passive exposure: Never   • Smokeless tobacco: Never   Vaping Use   • Vaping Use: Never used   Substance and Sexual Activity   • Alcohol use: Not Currently   • Drug use: Never   • Sexual activity: Yes     Partners: Male     Birth control/protection: None   Other Topics Concern   • Not on file   Social History Narrative   • Not on file     Social Determinants of Health     Financial Resource Strain: Not on file   Food Insecurity: Not on file   Transportation Needs: Not on file   Physical Activity: Not on file   Stress: Not on file   Social Connections: Not on file   Intimate Partner Violence: Not on file   Housing Stability: Not on file       Meds:  No current facility-administered medications on file prior to encounter. Current Outpatient Medications on File Prior to Encounter   Medication Sig Dispense Refill   • cetirizine (ZyrTEC) 10 mg tablet Take 10 mg by mouth if needed     • Ferrous Sulfate (Iron) 325 (65 Fe) MG TABS      • Prenatal Vit-Fe Fumarate-FA (PRENATAL VITAMINS PO) Take by mouth         Allergies:  No Known Allergies    Obstetric History:    G 3 P 1011  1 prior   6 lb 1 oz, induction, pushed 3 hrs    Labs:  No results found for: "STREPGRPB"  Type & Screen:  ABO Grouping   Date Value Ref Range Status   2023 A  Final      Rh Type   Date Value Ref Range Status   2023 RH(D) NEGATIVE  Final     Comment:        For additional information, please refer to   http://education. [employer]/faq/AHY231   (This link is being provided for informational/  educational purposes only.)      No results found for: "Maxime Loya"  No results found for: "SPECIMEXP"  HIV-1/HIV-2 AB   Date Value Ref Range Status   2023 Non-Reactive  Final     HIV AG/AB, 4th Gen   Date Value Ref Range Status   2023 NON-REACTIVE NON-REACTIVE Final     Comment:     HIV-1 antigen and HIV-1/HIV-2 antibodies were not  detected. There is no laboratory evidence of HIV  infection. PLEASE NOTE: This information has been disclosed to  you from records whose confidentiality may be  protected by state law. If your state requires such  protection, then the state law prohibits you from  making any further disclosure of the information  without the specific written consent of the person  to whom it pertains, or as otherwise permitted by law. A general authorization for the release of medical or  other information is NOT sufficient for this purpose.       For additional information please refer to  http://education. [employer]/faq/BKM301  (This link is being provided for informational/  educational purposes only.)        The performance of this assay has not been clinically  validated in patients less than 3years old. Hepatitis B Surface Ag   Date Value Ref Range Status   2023 nr  Final     RPR   Date Value Ref Range Status   2023 NON-REACTIVE NON-REACTIVE Final     No results found for: "RUBELLAIGGQT"  Glucose   Date Value Ref Range Status   2023 107 <135 mg/dL Final             Physical Exam:      Vital signs: 124/72  94  18  afebrile      Heart: RRR  Lungs: clear to ausculation   Abdomen: soft, nontender, gravid,   Estimated Fetal Weight: 7 lbs  Extremeties: min edema, no jose's or aminah's sign  Presentation: vertex  Cervix: 3 cm 25% -2  FHR: cat 1  CTXN: on monitor q 1-5 not felt by pt  Membranes: intact    Assessment:    at 40 weeks.    GBS negative  eIOL     Plan:   Discussed with Dr. Jostin Akers, cervix has progressed since scheduled for induction so no need for balloon, will begin pitocin now

## 2023-09-18 NOTE — L&D DELIVERY NOTE
Patient was found to be completely dilated and +1 station. After pushing for over 1 1/2 hours the FHT became a persistent category 2 tracing with fetal tachycardia and variable decelerations. There was moderate variability. Operative vaginal delivery was recommended. The head was ROT and asynclitic. Anesthesia was called to bolus the epidural catheter her analgesia was deemed adequate. Her rosa catheter was removed and her bladder was deemed empty. Jannis Silva forceps were used and first the anterior blade was placed followed by the posterior blade. After adjusting for the asynclitism a 90 degree rotation was performed. Traction during a single contraction brought the vertex to  and the forceps were removed. She delivered the head with the next maternal efforts. A loose nuchal cord was noted and reduced. The shoulders and the remainder of the infant delivered easily. An ankle cord was also noted. A liveborn female infant in OA position through meconium stained amniotic fluid at 1513, weight 3550 gm, APGARS 4 and 8 at 1 and 5 minutes, respectively. The infant was placed on maternal abdomen. Cord clamping was delayed for 30 seconds per neonatology, after which the cord was doubly clamped and cut. Cord gases were collected. Cord blood was collected. Pitocin was started for active management of the 3rd stage. Placenta delivered spontaneously thereafter. Bimanual exam was performed, and the fundus was noted to be firm. A second degree laceration was noted and repaired with 3-0 Vicryl suture. Excellent hemostasis was noted, with total EBL of 286 mL. The arterial pH was 6.95 and the low pH was discussed with the patient and her .     Teresita Schroeder MD  2023 4:20 PM

## 2023-09-18 NOTE — PLAN OF CARE
Problem: BIRTH - VAGINAL/ SECTION  Goal: Fetal and maternal status remain reassuring during the birth process  Description: INTERVENTIONS:  - Monitor vital signs  - Monitor fetal heart rate  - Monitor uterine activity  - Monitor labor progression (vaginal delivery)  - DVT prophylaxis  - Antibiotic prophylaxis  Outcome: Progressing  Goal: Emotionally satisfying birthing experience for mother/fetus  Description: Interventions:  - Assess, plan, implement and evaluate the nursing care given to the patient in labor  - Advocate the philosophy that each childbirth experience is a unique experience and support the family's chosen level of involvement and control during the labor process   - Actively participate in both the patient's and family's teaching of the birth process  - Consider cultural, Anglican and age-specific factors and plan care for the patient in labor  Outcome: Progressing     Problem: Knowledge Deficit  Goal: Verbalizes understanding of labor plan  Description: Assess patient/family/caregiver's baseline knowledge level and ability to understand information. Provide education via patient/family/caregiver's preferred learning method at appropriate level of understanding. 1. Provide teaching at level of understanding. 2. Provide teaching via preferred learning method(s). Outcome: Progressing  Goal: Patient/family/caregiver demonstrates understanding of disease process, treatment plan, medications, and discharge instructions  Description: Complete learning assessment and assess knowledge base. Interventions:  - Provide teaching at level of understanding  - Provide teaching via preferred learning methods  Outcome: Progressing     Problem: Labor & Delivery  Goal: Manages discomfort  Description: Assess and monitor for signs and symptoms of discomfort. Assess patient's pain level regularly and per hospital policy. Administer medications as ordered.  Support use of nonpharmacological methods to help control pain such as distraction, imagery, relaxation, and application of heat and cold. Collaborate with interdisciplinary team and patient to determine appropriate pain management plan. 1. Include patient in decisions related to comfort. 2. Offer non-pharmacological pain management interventions. 3. Report ineffective pain management to physician. Outcome: Progressing  Goal: Patient vital signs are stable  Description: 1. Assess vital signs - vaginal delivery.   Outcome: Progressing     Problem: PAIN - ADULT  Goal: Verbalizes/displays adequate comfort level or baseline comfort level  Description: Interventions:  - Encourage patient to monitor pain and request assistance  - Assess pain using appropriate pain scale  - Administer analgesics based on type and severity of pain and evaluate response  - Implement non-pharmacological measures as appropriate and evaluate response  - Consider cultural and social influences on pain and pain management  - Notify physician/advanced practitioner if interventions unsuccessful or patient reports new pain  Outcome: Progressing     Problem: INFECTION - ADULT  Goal: Absence or prevention of progression during hospitalization  Description: INTERVENTIONS:  - Assess and monitor for signs and symptoms of infection  - Monitor lab/diagnostic results  - Monitor all insertion sites, i.e. indwelling lines, tubes, and drains  - Monitor endotracheal if appropriate and nasal secretions for changes in amount and color  - [place] appropriate cooling/warming therapies per order  - Administer medications as ordered  - Instruct and encourage patient and family to use good hand hygiene technique  - Identify and instruct in appropriate isolation precautions for identified infection/condition  Outcome: Progressing  Goal: Absence of fever/infection during neutropenic period  Description: INTERVENTIONS:  - Monitor WBC    Outcome: Progressing     Problem: SAFETY ADULT  Goal: Patient will remain free of falls  Description: INTERVENTIONS:  - Educate patient/family on patient safety including physical limitations  - Instruct patient to call for assistance with activity   - Consult OT/PT to assist with strengthening/mobility   - Keep Call bell within reach  - Keep bed low and locked with side rails adjusted as appropriate  - Keep care items and personal belongings within reach  - Initiate and maintain comfort rounds  - Make Fall Risk Sign visible to staff  - Offer Toileting every  Hours, in advance of need  - Initiate/Maintain alarm  - Obtain necessary fall risk management equipment:   - Apply yellow socks and bracelet for high fall risk patients  - Consider moving patient to room near nurses station  Outcome: Progressing  Goal: Maintain or return to baseline ADL function  Description: INTERVENTIONS:  -  Assess patient's ability to carry out ADLs; assess patient's baseline for ADL function and identify physical deficits which impact ability to perform ADLs (bathing, care of mouth/teeth, toileting, grooming, dressing, etc.)  - Assess/evaluate cause of self-care deficits   - Assess range of motion  - Assess patient's mobility; develop plan if impaired  - Assess patient's need for assistive devices and provide as appropriate  - Encourage maximum independence but intervene and supervise when necessary  - Involve family in performance of ADLs  - Assess for home care needs following discharge   - Consider OT consult to assist with ADL evaluation and planning for discharge  - Provide patient education as appropriate  Outcome: Progressing  Goal: Maintains/Returns to pre admission functional level  Description: INTERVENTIONS:  - Perform BMAT or MOVE assessment daily.   - Set and communicate daily mobility goal to care team and patient/family/caregiver. - Collaborate with rehabilitation services on mobility goals if consulted  - Perform Range of Motion  times a day. - Reposition patient every  hours.   - Dangle patient  times a day  - Stand patient  times a day  - Ambulate patient  times a day  - Out of bed to chair  times a day   - Out of bed for meals times a day  - Out of bed for toileting  - Record patient progress and toleration of activity level   Outcome: Progressing     Problem: DISCHARGE PLANNING  Goal: Discharge to home or other facility with appropriate resources  Description: INTERVENTIONS:  - Identify barriers to discharge w/patient and caregiver  - Arrange for needed discharge resources and transportation as appropriate  - Identify discharge learning needs (meds, wound care, etc.)  - Arrange for interpretive services to assist at discharge as needed  - Refer to Case Management Department for coordinating discharge planning if the patient needs post-hospital services based on physician/advanced practitioner order or complex needs related to functional status, cognitive ability, or social support system  Outcome: Progressing     Problem: DISCHARGE PLANNING - CARE MANAGEMENT  Goal: Discharge to post-acute care or home with appropriate resources  Description: INTERVENTIONS:  - Conduct assessment to determine patient/family and health care team treatment goals, and need for post-acute services based on payer coverage, community resources, and patient preferences, and barriers to discharge  - Address psychosocial, clinical, and financial barriers to discharge as identified in assessment in conjunction with the patient/family and health care team  - Arrange appropriate level of post-acute services according to patient’s   needs and preference and payer coverage in collaboration with the physician and health care team  - Communicate with and update the patient/family, physician, and health care team regarding progress on the discharge plan  - Arrange appropriate transportation to post-acute venues  Outcome: Progressing

## 2023-09-18 NOTE — OB LABOR/OXYTOCIN SAFETY PROGRESS
Labor Progress Note - [place] Reasoner 33 y.o. female MRN: [id number]    Unit/Bed#: -01 Encounter: [id number]    Dose (bayron-units/min) Oxytocin: 6 bayron-units/min  Contraction Frequency (minutes): 3-4  Contraction Quality: Moderate  Tachysystole: No   Cervical Dilation: 10  Dilation Complete Date: 09/18/23  Dilation Complete Time: 1305  Cervical Effacement: 100  Fetal Station: 2  Baseline Rate: 155 bpm  Fetal Heart Rate: 150 BPM  FHR Category: 2               Vital Signs:   Vitals:    09/18/23 0926   BP: 113/71   Pulse: 71   Resp:    Temp:    SpO2:        Notes/comments:   SAFETY HUDDLE:  TRIGGER: Category 2 FHR tracing    PARTICIPANTS: Airam Johns RN    REVIEW OF CURRENT PLAN OF CARE AND MANAGEMENT: The FHR tracing shows moderate variability. There is now fetal tachycardia. Recurrent decelerations have been noted for the last hour despite resuscitative measures. The patient is in the second stage of labor. Maternal efforts have decreased due to fatigue. Progress in the second stage has been slow. After pushing for 1 1/2 hours she has brought the vertex from +1 to +2 station. For this reason, I recommend an operative vaginal delivery with forceps for persistent category II tracing.        TIMELINE FOR NEXT ASSESSMENT: n/a    ALL PARTICIPANTS IN AGREEMENT WITH PLAN OF CARE: Yes    IS PERRT REQUIRED: No     Sohan Escamilla MD 9/18/2023 2:47 PM

## 2023-09-18 NOTE — PLAN OF CARE
Problem: BIRTH - VAGINAL/ SECTION  Goal: Fetal and maternal status remain reassuring during the birth process  Description: INTERVENTIONS:  - Monitor vital signs  - Monitor fetal heart rate  - Monitor uterine activity  - Monitor labor progression (vaginal delivery)  - DVT prophylaxis  - Antibiotic prophylaxis  Outcome: Progressing  Goal: Emotionally satisfying birthing experience for mother/fetus  Description: Interventions:  - Assess, plan, implement and evaluate the nursing care given to the patient in labor  - Advocate the philosophy that each childbirth experience is a unique experience and support the family's chosen level of involvement and control during the labor process   - Actively participate in both the patient's and family's teaching of the birth process  - Consider cultural, Faith and age-specific factors and plan care for the patient in labor  Outcome: Progressing     Problem: Knowledge Deficit  Goal: Verbalizes understanding of labor plan  Description: Assess patient/family/caregiver's baseline knowledge level and ability to understand information. Provide education via patient/family/caregiver's preferred learning method at appropriate level of understanding. 1. Provide teaching at level of understanding. 2. Provide teaching via preferred learning method(s). Outcome: Progressing  Goal: Patient/family/caregiver demonstrates understanding of disease process, treatment plan, medications, and discharge instructions  Description: Complete learning assessment and assess knowledge base. Interventions:  - Provide teaching at level of understanding  - Provide teaching via preferred learning methods  Outcome: Progressing     Problem: Labor & Delivery  Goal: Manages discomfort  Description: Assess and monitor for signs and symptoms of discomfort. Assess patient's pain level regularly and per hospital policy. Administer medications as ordered.  Support use of nonpharmacological methods to help control pain such as distraction, imagery, relaxation, and application of heat and cold. Collaborate with interdisciplinary team and patient to determine appropriate pain management plan. 1. Include patient in decisions related to comfort. 2. Offer non-pharmacological pain management interventions. 3. Report ineffective pain management to physician. Outcome: Progressing  Goal: Patient vital signs are stable  Description: 1. Assess vital signs - vaginal delivery.   Outcome: Progressing     Problem: PAIN - ADULT  Goal: Verbalizes/displays adequate comfort level or baseline comfort level  Description: Interventions:  - Encourage patient to monitor pain and request assistance  - Assess pain using appropriate pain scale  - Administer analgesics based on type and severity of pain and evaluate response  - Implement non-pharmacological measures as appropriate and evaluate response  - Consider cultural and social influences on pain and pain management  - Notify physician/advanced practitioner if interventions unsuccessful or patient reports new pain  Outcome: Progressing     Problem: INFECTION - ADULT  Goal: Absence or prevention of progression during hospitalization  Description: INTERVENTIONS:  - Assess and monitor for signs and symptoms of infection  - Monitor lab/diagnostic results  - Monitor all insertion sites, i.e. indwelling lines, tubes, and drains  - Monitor endotracheal if appropriate and nasal secretions for changes in amount and color  - [place] appropriate cooling/warming therapies per order  - Administer medications as ordered  - Instruct and encourage patient and family to use good hand hygiene technique  - Identify and instruct in appropriate isolation precautions for identified infection/condition  Outcome: Progressing  Goal: Absence of fever/infection during neutropenic period  Description: INTERVENTIONS:  - Monitor WBC    Outcome: Progressing     Problem: SAFETY ADULT  Goal: Patient will remain free of falls  Description: INTERVENTIONS:  - Educate patient/family on patient safety including physical limitations  - Instruct patient to call for assistance with activity   - Consult OT/PT to assist with strengthening/mobility   - Keep Call bell within reach  - Keep bed low and locked with side rails adjusted as appropriate  - Keep care items and personal belongings within reach  - Initiate and maintain comfort rounds  - Make Fall Risk Sign visible to staff  - Offer Toileting every  Hours, in advance of need  - Initiate/Maintain alarm  - Obtain necessary fall risk management equipment:   - Apply yellow socks and bracelet for high fall risk patients  - Consider moving patient to room near nurses station  Outcome: Progressing  Goal: Maintain or return to baseline ADL function  Description: INTERVENTIONS:  -  Assess patient's ability to carry out ADLs; assess patient's baseline for ADL function and identify physical deficits which impact ability to perform ADLs (bathing, care of mouth/teeth, toileting, grooming, dressing, etc.)  - Assess/evaluate cause of self-care deficits   - Assess range of motion  - Assess patient's mobility; develop plan if impaired  - Assess patient's need for assistive devices and provide as appropriate  - Encourage maximum independence but intervene and supervise when necessary  - Involve family in performance of ADLs  - Assess for home care needs following discharge   - Consider OT consult to assist with ADL evaluation and planning for discharge  - Provide patient education as appropriate  Outcome: Progressing  Goal: Maintains/Returns to pre admission functional level  Description: INTERVENTIONS:  - Perform BMAT or MOVE assessment daily.   - Set and communicate daily mobility goal to care team and patient/family/caregiver. - Collaborate with rehabilitation services on mobility goals if consulted  - Perform Range of Motion  times a day. - Reposition patient every  hours.   - Dangle patient  times a day  - Stand patient  times a day  - Ambulate patient  times a day  - Out of bed to chair  times a day   - Out of bed for meals times a day  - Out of bed for toileting  - Record patient progress and toleration of activity level   Outcome: Progressing     Problem: DISCHARGE PLANNING  Goal: Discharge to home or other facility with appropriate resources  Description: INTERVENTIONS:  - Identify barriers to discharge w/patient and caregiver  - Arrange for needed discharge resources and transportation as appropriate  - Identify discharge learning needs (meds, wound care, etc.)  - Arrange for interpretive services to assist at discharge as needed  - Refer to Case Management Department for coordinating discharge planning if the patient needs post-hospital services based on physician/advanced practitioner order or complex needs related to functional status, cognitive ability, or social support system  Outcome: Progressing     Problem: DISCHARGE PLANNING - CARE MANAGEMENT  Goal: Discharge to post-acute care or home with appropriate resources  Description: INTERVENTIONS:  - Conduct assessment to determine patient/family and health care team treatment goals, and need for post-acute services based on payer coverage, community resources, and patient preferences, and barriers to discharge  - Address psychosocial, clinical, and financial barriers to discharge as identified in assessment in conjunction with the patient/family and health care team  - Arrange appropriate level of post-acute services according to patient’s   needs and preference and payer coverage in collaboration with the physician and health care team  - Communicate with and update the patient/family, physician, and health care team regarding progress on the discharge plan  - Arrange appropriate transportation to post-acute venues  Outcome: Progressing

## 2023-09-18 NOTE — OB LABOR/OXYTOCIN SAFETY PROGRESS
Oxytocin Safety Progress Check Note - Robi Parr 33 y.o. female MRN: [id number]    Unit/Bed#: -01 Encounter: [id number]    Dose (bayron-units/min) Oxytocin: 4 bayron-units/min  Contraction Frequency (minutes): 2-3.5  Contraction Quality: Mild  Tachysystole: No   Cervical Dilation: 5        Cervical Effacement: 70  Fetal Station: -3  Baseline Rate: 140 bpm  Fetal Heart Rate: 148 BPM  FHR Category: 1               Vital Signs:   Vitals:    09/18/23 0418   BP: 90/52   Pulse: 64   Resp:    Temp:    SpO2:        Notes/comments:       Pitocin back on, making progress  Continue induction    Dale Pisano DO 9/18/2023 5:07 AM

## 2023-09-18 NOTE — ANESTHESIA POSTPROCEDURE EVALUATION
Post-Op Assessment Note    CV Status:  Stable  Pain Score: 0    Pain management: adequate     Mental Status:  Alert and awake   Hydration Status:  Euvolemic and stable   PONV Controlled:  None   Airway Patency:  Patent      Post Op Vitals Reviewed: Yes      Staff: CRNA     Post-op block assessment: no complications      No notable events documented.     BP      Temp      Pulse     Resp      SpO2

## 2023-09-18 NOTE — OB LABOR/OXYTOCIN SAFETY PROGRESS
Labor Progress Note - Jessee Sis 33 y.o. female MRN: [id number]    Unit/Bed#: -01 Encounter: [id number]    Dose (bayron-units/min) Oxytocin: 6 bayron-units/min  Contraction Frequency (minutes): 3-4  Contraction Quality: Moderate  Tachysystole: No   Cervical Dilation: 10  Dilation Complete Date: 09/18/23  Dilation Complete Time: 1305  Cervical Effacement: 100  Fetal Station: 0  Baseline Rate: 155 bpm  Fetal Heart Rate: 150 BPM  FHR Category: 1               Vital Signs:   Vitals:    09/18/23 0926   BP: 113/71   Pulse: 71   Resp:    Temp:    SpO2:        Notes/comments: Feeling pressure with contractions. Fully dilated. No caput noted.  Will begin pushing and anticipate Behzad Llanes MD 9/18/2023 1:08 PM

## 2023-09-18 NOTE — OB LABOR/OXYTOCIN SAFETY PROGRESS
Oxytocin Safety Progress Check Note - Delmy Potterr 33 y.o. female MRN: [id number]    Unit/Bed#: -01 Encounter: [id number]    Dose (bayron-units/min) Oxytocin: 0 bayron-units/min (stopped per Dr. Jarrod Odom)  Contraction Frequency (minutes): 1.5-5  Contraction Quality: Mild  Tachysystole: No   Cervical Dilation: 3        Cervical Effacement: 30  Fetal Station: -2  Baseline Rate: 150 bpm  Fetal Heart Rate: 150 BPM  FHR Category: 2               Vital Signs:   Vitals:    09/17/23 2316   BP: 104/59   Pulse: 72   Resp:    Temp:    SpO2:        Notes/comments:     Have been watching fetal heart tracing closely, since initial exam, developed one decel then baseline up to 170- 180. Bolus fluid begun, pt now with 1700 cc fluid, baseline trending down now, but still often above 160. Pt was examined again approx 1100 PM and no change in cervix. Pitocin was stopped 1140 as ctx too close.   Pt now getting uncomfortable  Dr. Marybeth Georges notified of tachycardia at [phone] PM.  States continue induction and watch closely        Thomas Lester DO 9/18/2023 12:17 AM

## 2023-09-18 NOTE — OB LABOR/OXYTOCIN SAFETY PROGRESS
Labor Progress Note - Dianne Trotter 33 y.o. female MRN: [id number]    Unit/Bed#: -01 Encounter: [id number]    Dose (bayron-units/min) Oxytocin: 6 bayron-units/min  Contraction Frequency (minutes): 2-4  Contraction Quality: Mild  Tachysystole: No   Cervical Dilation: 6        Cervical Effacement: 70  Fetal Station: -2  Baseline Rate: 135 bpm  Fetal Heart Rate: 135 BPM  FHR Category:                Vital Signs:   Vitals:    09/18/23 0926   BP: 113/71   Pulse: 71   Resp:    Temp:    SpO2:        Notes/comments: Comfortable with epidural. AROM for a large amount of clear amniotic fluid. Continue Pitocin infusion.         Kodak Almeida MD 9/18/2023 9:59 AM

## 2023-09-18 NOTE — OB LABOR/OXYTOCIN SAFETY PROGRESS
Labor Progress Note - Carlos Galloway 33 y.o. female MRN: [id number]    Unit/Bed#: -01 Encounter: [id number]    Dose (bayron-units/min) Oxytocin: 6 bayron-units/min  Contraction Frequency (minutes): 2-4  Contraction Quality: Moderate  Tachysystole: No   Cervical Dilation: 8        Cervical Effacement: 80  Fetal Station: -1  Baseline Rate: 150 bpm  Fetal Heart Rate: 150 BPM  FHR Category: 2               Vital Signs:   Vitals:    09/18/23 0926   BP: 113/71   Pulse: 71   Resp:    Temp:    SpO2:        Notes/comments: Fetal tracing category 2 due to non repetitive variable decelerations. There is moderate variability and accelerations noted. Will employ position changes to facilitate rotation and anticipate full dilation/pushing shortly.         Jeferson Funez MD 9/18/2023 12:22 PM

## 2023-09-18 NOTE — ANESTHESIA PREPROCEDURE EVALUATION
Procedure:  LABOR ANALGESIA    Relevant Problems   ANESTHESIA (within normal limits)      HEMATOLOGY   (+) Anemia affecting pregnancy in third trimester      NEURO/PSYCH   (+) Anxiety      Labs:   Results from last 7 days   Lab Units 09/17/23 2044   WBC Thousand/uL 9.42   HEMOGLOBIN g/dL 12.1   HEMATOCRIT % 35.8   PLATELETS Thousands/uL 206   NEUTROS PCT % 65   MONOS PCT % 7   EOS PCT % 2     Type and Screen:  Results from last 7 days   Lab Units 09/17/23 2044   ABO GROUPING  A   RH FACTOR  Negative   ANTIBODY SCREEN  Positive   SPECIMEN EXPIRATION DATE  [id number]     Physical Exam    Airway    Mallampati score: I  TM Distance: >3 FB  Neck ROM: full     Dental   No notable dental hx     Cardiovascular  Cardiovascular exam normal    Pulmonary  Pulmonary exam normal     Other Findings        Anesthesia Plan  ASA Score- 2     Anesthesia Type- epidural with ASA Monitors. Additional Monitors:   Airway Plan:     Comment: Patient is requesting epidural for labor. Patient seen and examined. The risks/benefits of Epidural anesthesia discussed including risk of PDPH, partial or failed block, and rare emergencies including infection, nerve injury and total spinal anesthesia. Anesthetic plan for potential c/s in event of emergency reviewed with patient. .       Plan Factors-    Chart reviewed. Existing labs reviewed. Patient summary reviewed. Induction-     Postoperative Plan-     Informed Consent- Anesthetic plan and risks discussed with patient. I personally reviewed this patient with the CRNA. Discussed and agreed on the Anesthesia Plan with the CRNA. Fern Garibay

## 2023-09-19 ENCOUNTER — TELEPHONE (OUTPATIENT)
Dept: LABOR AND DELIVERY | Facility: HOSPITAL | Age: 33
End: 2023-09-19

## 2023-09-19 VITALS
DIASTOLIC BLOOD PRESSURE: 74 MMHG | WEIGHT: 222 LBS | OXYGEN SATURATION: 96 % | TEMPERATURE: 98.2 F | RESPIRATION RATE: 16 BRPM | SYSTOLIC BLOOD PRESSURE: 105 MMHG | HEIGHT: 63 IN | HEART RATE: 93 BPM | BODY MASS INDEX: 39.34 KG/M2

## 2023-09-19 LAB
ABO GROUP BLD: NORMAL
BLD GP AB SCN SERPL QL: POSITIVE
ERYTHROCYTE [DISTWIDTH] IN BLOOD BY AUTOMATED COUNT: 13.2 % (ref 11.6–15.1)
FETAL CELL SCN BLD QL ROSETTE: NEGATIVE
HCT VFR BLD AUTO: 32.3 % (ref 34.8–46.1)
HGB BLD-MCNC: 10.8 G/DL (ref 11.5–15.4)
MCH RBC QN AUTO: 30 PG (ref 26.8–34.3)
MCHC RBC AUTO-ENTMCNC: 33.4 G/DL (ref 31.4–37.4)
MCV RBC AUTO: 90 FL (ref 82–98)
PLATELET # BLD AUTO: 151 THOUSANDS/UL (ref 149–390)
PMV BLD AUTO: 11.2 FL (ref 8.9–12.7)
RBC # BLD AUTO: 3.6 MILLION/UL (ref 3.81–5.12)
RH BLD: NEGATIVE
WBC # BLD AUTO: 11.3 THOUSAND/UL (ref 4.31–10.16)

## 2023-09-19 PROCEDURE — 99024 POSTOP FOLLOW-UP VISIT: CPT | Performed by: STUDENT IN AN ORGANIZED HEALTH CARE EDUCATION/TRAINING PROGRAM

## 2023-09-19 PROCEDURE — 86901 BLOOD TYPING SEROLOGIC RH(D): CPT | Performed by: OBSTETRICS & GYNECOLOGY

## 2023-09-19 PROCEDURE — 86900 BLOOD TYPING SEROLOGIC ABO: CPT | Performed by: OBSTETRICS & GYNECOLOGY

## 2023-09-19 PROCEDURE — NC001 PR NO CHARGE: Performed by: STUDENT IN AN ORGANIZED HEALTH CARE EDUCATION/TRAINING PROGRAM

## 2023-09-19 PROCEDURE — 85461 HEMOGLOBIN FETAL: CPT | Performed by: OBSTETRICS & GYNECOLOGY

## 2023-09-19 PROCEDURE — 85027 COMPLETE CBC AUTOMATED: CPT | Performed by: OBSTETRICS & GYNECOLOGY

## 2023-09-19 PROCEDURE — 86850 RBC ANTIBODY SCREEN: CPT | Performed by: OBSTETRICS & GYNECOLOGY

## 2023-09-19 RX ORDER — IBUPROFEN 200 MG
600 TABLET ORAL EVERY 6 HOURS PRN
Start: 2023-09-19

## 2023-09-19 RX ORDER — DOCUSATE SODIUM 100 MG/1
100 CAPSULE, LIQUID FILLED ORAL 2 TIMES DAILY PRN
Refills: 0
Start: 2023-09-19

## 2023-09-19 RX ORDER — ACETAMINOPHEN 325 MG/1
650 TABLET ORAL EVERY 4 HOURS PRN
Refills: 0
Start: 2023-09-19

## 2023-09-19 RX ADMIN — IBUPROFEN 600 MG: 600 TABLET, FILM COATED ORAL at 00:48

## 2023-09-19 RX ADMIN — ACETAMINOPHEN 650 MG: 325 TABLET, FILM COATED ORAL at 08:42

## 2023-09-19 RX ADMIN — Medication 1 TABLET: at 08:42

## 2023-09-19 RX ADMIN — DOCUSATE SODIUM 100 MG: 100 CAPSULE, LIQUID FILLED ORAL at 08:42

## 2023-09-19 RX ADMIN — BENZOCAINE AND LEVOMENTHOL 1 APPLICATION: 200; 5 SPRAY TOPICAL at 10:48

## 2023-09-19 RX ADMIN — ACETAMINOPHEN 650 MG: 325 TABLET, FILM COATED ORAL at 00:48

## 2023-09-19 RX ADMIN — HUMAN RHO(D) IMMUNE GLOBULIN 300 MCG: 300 INJECTION, SOLUTION INTRAMUSCULAR at 11:36

## 2023-09-19 RX ADMIN — IBUPROFEN 600 MG: 600 TABLET, FILM COATED ORAL at 08:42

## 2023-09-19 NOTE — TELEPHONE ENCOUNTER
Patient is being discharged to home today from [facility] following forceps delivery with Dr. Juany Zee on 9/18/2023. Her hospital course was uncomplicated. She should be scheduled for routine postpartum care in office.     Adi Ross MD  9/19/2023 5:46 PM

## 2023-09-19 NOTE — PROGRESS NOTES
Pt given all discharge instructions. Patient verbalized understanding of all information and had no questions.

## 2023-09-19 NOTE — LACTATION NOTE
This note was copied from a baby's chart. Met with parents to discuss feeding plan. Mother discussed that she is pumping to provide expressed breast milk and is using formula as supplement in addition to colostrum to provide feedings until mature milk comes in. The Ready, Set, Baby Booklet was discussed briefly. Discussed importance of skin to skin to help baby with milk production. Diiscussed the Breastfeeding Discharge Booklet with plans for early discharge. Baby is currently under 1% weight loss, having appropriate output for her age and 25 hour bilirubin will be checked this afternoon. Discussed the importance of ensuring that baby feeds 8-12x in 24 hours and that baby has 6 wet diapers or more that are becoming more dilute as well as soiled diapers that are transitioning demonstrated by color change from meconium to a yellow/gold seedy loose stool by day 5. Mother was given resources to look up medications to ensure they are safe with breastfeeding, by communicating with the [address] [place] [address] as well as using [employer]. [employer] Check (assisted mother to pin to home screen on personal phone). Discussed engorgement time frame (when mature milk comes in) and management as well as how to deal with conditions that may occur while breastfeeding (plugged ducts, milk blebs and mastitis) and when is appropriate to communicate with her OB/GYN and/or a lactation consultant. Discussed how to set up a pump, how to cycle (stimulation vs expression phases during a pumping session using the Spectra S2 breast pump as example as mother will be using pump at home), importance of flange fit and trying different sizes to ensure best fit (mother discussed that she has measured and will be using the 24 mm), milk storage and how to properly clean parts. Discussed "hands on" pumping to empty breasts out more effectively than with pump alone.  Mother was given a pumping log as well as a handout on "Increasing the [facility]" that was discussed during the encounter. Mother was shown community resources for continued support in breastfeeding once discharged home. She was encouraged to communicate with [address] [place] [address] for lactation home visits and/or with her baby's pediatrician for lactation support/services that could be offered in the practice or close to home. Parents were encouraged to call for further questions that arise prior to discharge.

## 2023-09-19 NOTE — PROGRESS NOTES
Progress Note - OB/GYN  Post-Partum Physician Note   Jose Daniel Rodriguez 33 y.o. female MRN: [id number]  Unit/Bed#:   Encounter: [id number]    Assessment:  33y.o. year-old , postpartum day #1 s/p FAVD    Plan:   Continue routine postpartum care  Encourage ambulation  Patient is a candidate for early discharge, pending disposition of infant.      Rh negative state in antepartum period, third trimester  - Plan for Rhogam administration prior to discharge.   _________________________________    Subjective:   Pain: Well controlled  Tolerating Oral Intake: Yes  Voiding: Yes  Ambulating: Yes  Chest Pain: No  Shortness of Breath: No  Leg Pain/Discomfort: No  Lochia: Normal    Objective:   Vitals:    23 1730 23 2300 23 0300 23 0842   BP: 120/64 120/76 117/75    BP Location:  Right arm Left arm    Pulse: 91 88 84    Resp:  18 16    Temp:  98.4 °F (36.9 °C) 98.4 °F (36.9 °C)    TempSrc:  Oral Oral    SpO2:   100%    Weight:    101 kg (222 lb)   Height:    5' 3" (1.6 m)       Intake/Output Summary (Last 24 hours) at 2023 1049  Last data filed at 2023 0100  Gross per 24 hour   Intake --   Output 1336 ml   Net -1336 ml       Physical Exam:  General: in no apparent distress, alert, oriented times 3 and afebrile  Abdomen: abdomen is soft without significant tenderness, masses, organomegaly or guarding  Fundus: Firm and non-tender, 1 below the umbilicus  Lower extremeties: non-tender    Labs/Tests:   Lab Results   Component Value Date/Time    HGB 10.8 (L) 2023 09:01 AM    HGB 12.1 2023 08:44 PM     2023 09:01 AM     2023 08:44 PM    WBC 11.30 (H) 2023 09:01 AM    WBC 9.42 2023 08:44 PM        Brief OB Lab review:  ABO Grouping   Date Value Ref Range Status   2023 A  Final      Rh Factor   Date Value Ref Range Status   2023 Negative  Final     Rh Type   Date Value Ref Range Status   2023 RH(D) NEGATIVE  Final Comment:        For additional information, please refer to   [email or web address]/[email or web address]   (This link is being provided for informational/  educational purposes only.)      No results found for: "ANTIBODYSCR"  No results found for: "RUBM"    MEDS:   Current Facility-Administered Medications   Medication Dose Route Frequency   • acetaminophen (TYLENOL) tablet 650 mg  650 mg Oral Q4H PRN   • benzocaine-menthol-lanolin-aloe (DERMOPLAST) 20-0.5 % topical spray 1 Application  1 Application Topical L1Y PRN   • calcium carbonate (TUMS) chewable tablet 1,000 mg  1,000 mg Oral Daily PRN   • docusate sodium (COLACE) capsule 100 mg  100 mg Oral BID   • hydrocortisone 1 % cream 1 Application  1 Application Topical Daily PRN   • ibuprofen (MOTRIN) tablet 600 mg  600 mg Oral Q6H PRN   • oxyCODONE (ROXICODONE) IR tablet 5 mg  5 mg Oral Q3H PRN   • oxytocin (PITOCIN) 30 Units in lactated ringers 500 mL infusion  250 bayron-units/min Intravenous Once   • prenatal multivitamin tablet 1 tablet  1 tablet Oral Daily   • Rho(D) immune globulin (RHOGAM ULTRA-FILTERED PLUS) IM injection 300 mcg  300 mcg Intramuscular Once   • simethicone (MYLICON) chewable tablet 80 mg  80 mg Oral 4x Daily PRN   • witch hazel-glycerin (TUCKS) topical pad 1 Pad  1 Pad Topical Q4H PRN     Invasive Devices     Peripheral Intravenous Line  Duration           Peripheral IV 09/17/23 Left Forearm 1 day                  Tiny Thibodeaux MD  9/19/2023 10:49 AM

## 2023-09-19 NOTE — DISCHARGE SUMMARY
Discharge Summary - OB/GYN   Nicole Sierra 33 y.o. female MRN: [id number]  Unit/Bed#: -01 Encounter: [id number]    Admission Date: 2023     Discharge Date: 23    Principal Diagnosis: Encounter for elective induction of labor [Z34.90], [id number] Pregnancy at 40w4d    Secondary Diagnosis: n/a    Attending - Delivery: Siria Narvaez MD         - Discharge: Adi Ross MD    Procedures: Vaginal, Forceps , 2nd degree laceration    Anesthesia: epidural    Complications: none apparent    Hospital Course:      Nicole Sierra is a 33 y.o. [id number] at 40w4d who was admitted for induction of labor. She delivered a viable  with weight of 3550 grams, apgars of 4 (1 min) and 9 (5 min).  was transferred to  nursery. Patient tolerated the procedure well and was transferred to recovery in stable condition. Her postpartum course was uncomplicated. Admission hemoglobin was 12.1, postpartum was 10.8. On day of discharge, she was ambulating and able to reasonably perform all ADLs. She was voiding and had appropriate bowel function. Pain was well controlled. She was discharged home on postpartum day #1 without complications. Patient was instructed to follow up with her OB as an outpatient and was given appropriate warnings to call provider if she develops signs of infection or uncontrolled pain. Condition at discharge: good     Discharge instructions/Information to patient and family:   See after visit summary for information provided to patient and family. Provisions for Follow-Up Care:  See after visit summary for information related to follow-up care and any pertinent home health orders. Disposition: See After Visit Summary for discharge disposition information. Planned Readmission: No    Discharge Medications: For a complete list of the patient's medications, please refer to her med rec.     Adi Ross MD  2023 5:45 PM

## 2023-09-26 ENCOUNTER — TELEPHONE (OUTPATIENT)
Dept: OBGYN CLINIC | Facility: CLINIC | Age: 33
End: 2023-09-26

## 2023-09-26 NOTE — TELEPHONE ENCOUNTER
Agree with recommendations. Recommend elevation of feet when possible in addition to compression stockings. If calf swelling/tenderness becomes worse or if development of erythema, would recommend dopplers at that time. Otherwise, management as recommended.      Lucille Mg MD  9/26/2023 12:06 PM

## 2023-09-26 NOTE — TELEPHONE ENCOUNTER
[facility] states when she moves from sitting to standing or bending over or going up stairs. She gets a warm sensation that goes from her uterus up to her mid torso area. Denies shortness of breath. FAVD. Aurelio Messer Feels similar to when she is pumping but more prominent with activities listed above. Also reports bilat calf tenderness. Left more than right. Denies warm to touch, redness. Discomfort more prominent with sitting. Bilateral lower extremity edema that has not gotten worse. Reviewed increase water intake, wear compression stocking when OOB to aid in circulation and reabsorption of excess fluid. PP vaginal bleeding continues moderately. Feels gush each time she stands. Denies soaking pad every hour. Denies vaginal or pelvic pain. Reviewed sensation in mid torso most likely due to muscles/internal organs readjusting to normal post delivery. Recommend belly band for support. Limit stairs. Proper hydration and nutritional intake. Patient concerned her blood pressure may be on the low side causing her to feel fatigue/weakness in lower extremities- Encouraged home b/p monitor and report b/p's to [facility] for eval. Dr. Zak Nieves, please review and provide any additional instruction.

## 2023-09-26 NOTE — TELEPHONE ENCOUNTER
Spoke with Elham Amezquita, reviewed Dr Malave Bound reply. Encourged to call back with any changes or additional concerns including but not limited to increased swelling, increased pain, redness, warmth to touch, pain with ambulation.   Patient verbalized understanding and is in agreement

## 2023-09-27 ENCOUNTER — TELEPHONE (OUTPATIENT)
Dept: OBGYN CLINIC | Facility: CLINIC | Age: 33
End: 2023-09-27

## 2023-09-27 NOTE — TELEPHONE ENCOUNTER
Dennis Sunshine called with f/u b/p readings last night 116/74. This morning 104/83, 119/90, 115/86. No increase in swelling. Reports occasionally dizziness with increased activity. Encouraged increase water intake, change positions slowly- Body adjusting to decreased blood volume. Continue to monitor and report any changes, questions or concerns. Patient in agreement.

## 2023-10-24 ENCOUNTER — POSTPARTUM VISIT (OUTPATIENT)
Dept: OBGYN CLINIC | Facility: CLINIC | Age: 33
End: 2023-10-24

## 2023-10-24 VITALS — DIASTOLIC BLOOD PRESSURE: 70 MMHG | SYSTOLIC BLOOD PRESSURE: 104 MMHG | BODY MASS INDEX: 33.48 KG/M2 | WEIGHT: 189 LBS

## 2023-10-24 DIAGNOSIS — R20.2 PARESTHESIA OF RIGHT LEG: Primary | ICD-10-CM

## 2023-10-24 PROBLEM — O99.213 OBESITY AFFECTING PREGNANCY IN THIRD TRIMESTER: Status: RESOLVED | Noted: 2023-02-08 | Resolved: 2023-10-24

## 2023-10-24 PROBLEM — O99.343 MENTAL DISORDER AFFECTING PREGNANCY IN THIRD TRIMESTER: Status: RESOLVED | Noted: 2023-02-08 | Resolved: 2023-10-24

## 2023-10-24 PROBLEM — O99.013 ANEMIA AFFECTING PREGNANCY IN THIRD TRIMESTER: Status: RESOLVED | Noted: 2023-06-27 | Resolved: 2023-10-24

## 2023-10-24 PROBLEM — Z3A.39 39 WEEKS GESTATION OF PREGNANCY: Status: RESOLVED | Noted: 2023-02-08 | Resolved: 2023-10-24

## 2023-10-24 PROBLEM — O26.893 RH NEGATIVE STATE IN ANTEPARTUM PERIOD, THIRD TRIMESTER: Status: RESOLVED | Noted: 2023-04-06 | Resolved: 2023-10-24

## 2023-10-24 PROBLEM — Z67.91 RH NEGATIVE STATE IN ANTEPARTUM PERIOD, THIRD TRIMESTER: Status: RESOLVED | Noted: 2023-04-06 | Resolved: 2023-10-24

## 2023-10-24 NOTE — PATIENT INSTRUCTIONS
- Through a patient centered approach to contraceptive counseling, we discussed a variety of contraceptive methods including pill, patch, ring, injectable, long-acting reversible methods such as the subdermal contraceptive implant and intrauterine device, and sterilization. We compared the efficacy of various methods using a tiered efficacy chart. We discussed the expected changes on menstrual bleeding and other side effects of various methods. - We assessed for medical conditions that could affect the safety profile of contraception. She does not smoke, denies a history of hypertension or VTE, and denies a history of migraine with aura. - The patient opts to proceed with vasectomy  and condoms till .

## 2023-10-24 NOTE — PROGRESS NOTES
PROBLEM GYNECOLOGICAL VISIT    Johana White is a 33 y.o. female who presents today for  PP  exam.  . Her general medical history has been reviewed and she reports it as follows:    Past Medical History:   Diagnosis Date    Anxiety     Depression     History of usign Lexapro until mid pregnancy in 2019 and stopped. Currently self-managed and no medication    History of gallstones 2019    Cholecstectomy 20    Panic attack      Past Surgical History:   Procedure Laterality Date    CHOLECYSTECTOMY  2019     OB History          3    Para   2    Term   2            AB   1    Living   2         SAB   1    IAB   0    Ectopic   0    Multiple   0    Live Births   2               Social History     Tobacco Use    Smoking status: Never     Passive exposure: Never    Smokeless tobacco: Never   Vaping Use    Vaping Use: Never used   Substance Use Topics    Alcohol use: Not Currently    Drug use: Never       Current Outpatient Medications   Medication Instructions    acetaminophen (TYLENOL) 650 mg, Oral, Every 4 hours PRN    cetirizine (ZYRTEC) 10 mg, Oral, As needed    docusate sodium (COLACE) 100 mg, Oral, 2 times daily PRN    Ferrous Sulfate (Iron) 325 (65 Fe) MG TABS No dose, route, or frequency recorded. ibuprofen (MOTRIN) 600 mg, Oral, Every 6 hours PRN    Prenatal Vit-Fe Fumarate-FA (PRENATAL VITAMINS PO) Oral       History of Present Illness: Forcep delivery on  female   3550 gm   apgars  4/9 . Pt  w numbness in her  right leg after delivery. Can start in her hip area  down ro foot  Getting  better but difficulty w walking up stairs and  putting pressure rosendo  right foot when getting up  from the  floor. Originally felt she slapped her foot w  walking. + . Bottle feeding.  + feels attached to the baby. Lochia ended last week. No problems w  bowels or bladder. Has not had relations yet. Plan is for vasectomy.         Review of Systems:  Review of Systems   Constitutional: Negative. Cardiovascular: Negative. Gastrointestinal: Negative. Endocrine: Negative. Genitourinary: Negative. Musculoskeletal:         Numbness down  right leg from hip to   dorsal side of  foot    Skin: Negative. Neurological:  Positive for numbness. Hematological: Negative. Psychiatric/Behavioral: Negative. Physical Exam:  LMP 12/08/2022 (Exact Date)   Physical Exam  Constitutional:       Appearance: Normal appearance. Genitourinary:      Bladder, rectum and urethral meatus normal.      No lesions in the vagina. Genitourinary Comments: Levators  3  normal size uterus        Right Labia: No rash, tenderness, lesions or skin changes. Left Labia: No tenderness, lesions, skin changes or rash. No inguinal adenopathy present in the right or left side. No vaginal discharge, erythema, tenderness, bleeding, ulceration, granulation tissue or cuff induration. No vaginal prolapse present. No vaginal atrophy present. Right Adnexa: not tender, not full, no mass present and not absent. Left Adnexa: not tender, not full, no mass present and not absent. Cervix is not nulliparous or parous. No cervical motion tenderness, discharge, friability, lesion, polyp or nabothian cyst.      Uterus is not enlarged, fixed, tender or prolapsed. No uterine mass detected. Uterus is midaxial.      Uterus is not absent. No urethral prolapse, tenderness, mass, hypermobility or discharge present. Pelvic Floor: Levator muscle strength is 5/5. Pelvic floor neuro is intact. Pelvic exam was performed with patient in the lithotomy position. HENT:      Head: Normocephalic. Abdominal:      Palpations: Abdomen is soft. Hernia: There is no hernia in the left inguinal area or right inguinal area. Musculoskeletal:         General: Normal range of motion. Cervical back: Normal range of motion.       Comments: Slow steady gait     Lymphadenopathy: Lower Body: No right inguinal adenopathy. No left inguinal adenopathy. Neurological:      Mental Status: She is alert and oriented to person, place, and time. Sensory: No sensory deficit. Motor: No weakness. Coordination: Coordination normal.      Gait: Gait normal.   Skin:     General: Skin is warm and dry. Psychiatric:         Mood and Affect: Mood normal.         Behavior: Behavior normal.         Thought Content: Thought content normal.         Judgment: Judgment normal.   Vitals and nursing note reviewed. Assessment:   1. PP  5 + weeks may return to work   , numbness  in right  lower extremity     Plan:   Plan  is to return to work ,    contraception is condoms   dw pt   IUD ,    PT evaluation for   numbness in   right lower extremity. Order placed notify office  if she does not hear from them in  1 week. Flu shot  dw pt . Contact office if referral needed for [facility] . Reviewed with patient that test results are available in [facility] immediately, but that they will not necessarily be reviewed by me immediately. Explained that I will review results at my earliest opportunity and contact patient appropriately.